# Patient Record
Sex: MALE | Race: BLACK OR AFRICAN AMERICAN | NOT HISPANIC OR LATINO | Employment: OTHER | URBAN - METROPOLITAN AREA
[De-identification: names, ages, dates, MRNs, and addresses within clinical notes are randomized per-mention and may not be internally consistent; named-entity substitution may affect disease eponyms.]

---

## 2023-03-07 DIAGNOSIS — I10 HYPERTENSION, UNSPECIFIED TYPE: Primary | ICD-10-CM

## 2023-03-07 RX ORDER — LISINOPRIL 40 MG/1
1 TABLET ORAL DAILY
COMMUNITY
Start: 2022-07-19 | End: 2023-03-07 | Stop reason: SDUPTHER

## 2023-03-08 ENCOUNTER — TELEPHONE (OUTPATIENT)
Dept: PRIMARY CARE | Facility: CLINIC | Age: 63
End: 2023-03-08
Payer: COMMERCIAL

## 2023-03-08 DIAGNOSIS — I10 HYPERTENSION, UNSPECIFIED TYPE: ICD-10-CM

## 2023-03-08 RX ORDER — LISINOPRIL 40 MG/1
40 TABLET ORAL DAILY
Qty: 90 TABLET | Refills: 3 | Status: SHIPPED | OUTPATIENT
Start: 2023-03-08 | End: 2024-02-22 | Stop reason: SDUPTHER

## 2023-03-08 RX ORDER — LISINOPRIL 40 MG/1
40 TABLET ORAL DAILY
Qty: 90 TABLET | Refills: 1 | Status: SHIPPED | OUTPATIENT
Start: 2023-03-08 | End: 2023-03-08 | Stop reason: SDUPTHER

## 2023-04-11 ENCOUNTER — HOSPITAL ENCOUNTER (OUTPATIENT)
Dept: DATA CONVERSION | Facility: HOSPITAL | Age: 63
End: 2023-04-11
Attending: RADIOLOGY
Payer: COMMERCIAL

## 2023-04-11 DIAGNOSIS — N13.30 UNSPECIFIED HYDRONEPHROSIS: ICD-10-CM

## 2023-04-11 LAB
ERYTHROCYTE DISTRIBUTION WIDTH (RATIO) BY AUTOMATED COUNT: 13.5 % (ref 11.5–14.5)
ERYTHROCYTE MEAN CORPUSCULAR HEMOGLOBIN CONCENTRATION (G/DL) BY AUTOMATED: 34.1 G/DL (ref 32–36)
ERYTHROCYTE MEAN CORPUSCULAR VOLUME (FL) BY AUTOMATED COUNT: 90 FL (ref 80–100)
ERYTHROCYTES (10*6/UL) IN BLOOD BY AUTOMATED COUNT: 5.1 X10E12/L (ref 4.5–5.9)
HEMATOCRIT (%) IN BLOOD BY AUTOMATED COUNT: 46 % (ref 41–52)
HEMOGLOBIN (G/DL) IN BLOOD: 15.7 G/DL (ref 13.5–17.5)
LEUKOCYTES (10*3/UL) IN BLOOD BY AUTOMATED COUNT: 4.4 X10E9/L (ref 4.4–11.3)
PLATELETS (10*3/UL) IN BLOOD AUTOMATED COUNT: 213 X10E9/L (ref 150–450)

## 2023-04-14 ENCOUNTER — OFFICE VISIT (OUTPATIENT)
Dept: PRIMARY CARE | Facility: CLINIC | Age: 63
End: 2023-04-14
Payer: COMMERCIAL

## 2023-04-14 VITALS
SYSTOLIC BLOOD PRESSURE: 119 MMHG | DIASTOLIC BLOOD PRESSURE: 74 MMHG | WEIGHT: 197 LBS | HEART RATE: 54 BPM | RESPIRATION RATE: 16 BRPM | HEIGHT: 67 IN | OXYGEN SATURATION: 95 % | BODY MASS INDEX: 30.92 KG/M2

## 2023-04-14 DIAGNOSIS — Z00.00 HEALTH CARE MAINTENANCE: ICD-10-CM

## 2023-04-14 DIAGNOSIS — N40.1 BENIGN PROSTATIC HYPERPLASIA WITH LOWER URINARY TRACT SYMPTOMS, SYMPTOM DETAILS UNSPECIFIED: ICD-10-CM

## 2023-04-14 DIAGNOSIS — Q62.11 HYDRONEPHROSIS WITH URETEROPELVIC JUNCTION (UPJ) OBSTRUCTION: ICD-10-CM

## 2023-04-14 DIAGNOSIS — I10 HYPERTENSION, UNSPECIFIED TYPE: Primary | ICD-10-CM

## 2023-04-14 DIAGNOSIS — M25.512 CHRONIC LEFT SHOULDER PAIN: ICD-10-CM

## 2023-04-14 DIAGNOSIS — Z12.11 COLON CANCER SCREENING: ICD-10-CM

## 2023-04-14 DIAGNOSIS — E55.9 VITAMIN D DEFICIENCY: ICD-10-CM

## 2023-04-14 DIAGNOSIS — G89.29 CHRONIC LEFT SHOULDER PAIN: ICD-10-CM

## 2023-04-14 PROBLEM — N13.5 URETEROPELVIC JUNCTION (UPJ) OBSTRUCTION, LEFT: Status: ACTIVE | Noted: 2023-04-14

## 2023-04-14 PROBLEM — N13.8 BENIGN PROSTATIC HYPERPLASIA WITH URINARY OBSTRUCTION AND OTHER LOWER URINARY TRACT SYMPTOMS: Status: ACTIVE | Noted: 2023-04-14

## 2023-04-14 PROBLEM — N13.30 HYDRONEPHROSIS, LEFT: Status: ACTIVE | Noted: 2023-04-14

## 2023-04-14 PROBLEM — E53.8 VITAMIN B12 DEFICIENCY: Status: ACTIVE | Noted: 2023-04-14

## 2023-04-14 PROBLEM — R35.1 NOCTURIA: Status: ACTIVE | Noted: 2023-04-14

## 2023-04-14 PROBLEM — R00.2 PALPITATIONS: Status: ACTIVE | Noted: 2023-04-14

## 2023-04-14 PROBLEM — E53.8 VITAMIN B12 DEFICIENCY: Status: RESOLVED | Noted: 2023-04-14 | Resolved: 2023-04-14

## 2023-04-14 PROCEDURE — 3078F DIAST BP <80 MM HG: CPT | Performed by: INTERNAL MEDICINE

## 2023-04-14 PROCEDURE — 3074F SYST BP LT 130 MM HG: CPT | Performed by: INTERNAL MEDICINE

## 2023-04-14 PROCEDURE — 99204 OFFICE O/P NEW MOD 45 MIN: CPT | Performed by: INTERNAL MEDICINE

## 2023-04-14 PROCEDURE — 1036F TOBACCO NON-USER: CPT | Performed by: INTERNAL MEDICINE

## 2023-04-14 RX ORDER — AMLODIPINE BESYLATE 10 MG/1
10 TABLET ORAL DAILY
COMMUNITY
End: 2023-08-01 | Stop reason: SDUPTHER

## 2023-04-14 RX ORDER — OXYBUTYNIN CHLORIDE 5 MG/1
TABLET ORAL
COMMUNITY
Start: 2023-04-13 | End: 2024-04-04 | Stop reason: WASHOUT

## 2023-04-14 RX ORDER — TRAMADOL HYDROCHLORIDE 50 MG/1
TABLET ORAL
COMMUNITY
Start: 2022-09-22 | End: 2024-04-04 | Stop reason: WASHOUT

## 2023-04-14 RX ORDER — METOPROLOL TARTRATE 50 MG/1
1 TABLET ORAL EVERY 12 HOURS
COMMUNITY
Start: 2022-07-19 | End: 2024-04-04 | Stop reason: SDUPTHER

## 2023-04-14 RX ORDER — CEFDINIR 300 MG/1
CAPSULE ORAL
COMMUNITY
Start: 2023-04-13 | End: 2024-04-04 | Stop reason: WASHOUT

## 2023-04-14 RX ORDER — TAMSULOSIN HYDROCHLORIDE 0.4 MG/1
CAPSULE ORAL
COMMUNITY
Start: 2023-04-13 | End: 2024-04-04 | Stop reason: WASHOUT

## 2023-04-14 SDOH — ECONOMIC STABILITY: FOOD INSECURITY: WITHIN THE PAST 12 MONTHS, THE FOOD YOU BOUGHT JUST DIDN'T LAST AND YOU DIDN'T HAVE MONEY TO GET MORE.: NEVER TRUE

## 2023-04-14 SDOH — ECONOMIC STABILITY: FOOD INSECURITY: WITHIN THE PAST 12 MONTHS, YOU WORRIED THAT YOUR FOOD WOULD RUN OUT BEFORE YOU GOT MONEY TO BUY MORE.: NEVER TRUE

## 2023-04-14 ASSESSMENT — PATIENT HEALTH QUESTIONNAIRE - PHQ9
2. FEELING DOWN, DEPRESSED OR HOPELESS: NOT AT ALL
1. LITTLE INTEREST OR PLEASURE IN DOING THINGS: NOT AT ALL
SUM OF ALL RESPONSES TO PHQ9 QUESTIONS 1 & 2: 0

## 2023-04-14 ASSESSMENT — LIFESTYLE VARIABLES
SKIP TO QUESTIONS 9-10: 1
HOW OFTEN DO YOU HAVE SIX OR MORE DRINKS ON ONE OCCASION: NEVER
HOW OFTEN DO YOU HAVE A DRINK CONTAINING ALCOHOL: NEVER
AUDIT-C TOTAL SCORE: 0
HOW MANY STANDARD DRINKS CONTAINING ALCOHOL DO YOU HAVE ON A TYPICAL DAY: PATIENT DOES NOT DRINK

## 2023-04-14 NOTE — ASSESSMENT & PLAN NOTE
Patient is to follow up with Dr Preston, he was just discharged from the hospital, [facility], yesterday. He is to follow up with Dr Preston for removal of stent, recheck renal function

## 2023-04-14 NOTE — ASSESSMENT & PLAN NOTE
This is an ongoing issue, it is a chronic problem, will refer to orthopedics/ shoulder for an assessment of the shoulder issue

## 2023-04-14 NOTE — PROGRESS NOTES
Chief Complaint/HPI:    Initial visit with me, patient previously saw Yeison Mckenzie NP last year for one visit.    Patient was recently hospitalized with UPJ obstruction of the left kidney and hydronephrosis. Patient was placed on tamsulosin and oxybutynin. He is to follow up with urology for re assessment. Patient has had stent placement x 2 in the past per hospital record, patient had drainage tube placed in the back. This was placed to drain he hydronephrosis, he has seen Dr Preston for treatment. Patient still has pain in the left back at site of drainage tube placement.  He had additional stent placed recently. Patient sees Dr Preston for follow up on 4/25/2023, he apparently is supposed to have stents removed at that time    HTN: patient takes amlodipine, lisinopril, metoprolol ordered , BP is well controlled    Vitamin d deficiency: this is noted, he takes no meds for this    Left shoulder pain: patient struck shoulder on a TV when he was young, patient moves a lot at work, he has ongoing pain of the left shoulder      ROS otherwise negative aside from what was mentioned above in HPI.      Patient Active Problem List   Diagnosis    Hypertension    Hydronephrosis with ureteropelvic junction (UPJ) obstruction    Benign prostatic hyperplasia with lower urinary tract symptoms    Hydronephrosis, left    Nocturia    Palpitations    Ureteropelvic junction (UPJ) obstruction, left    Vitamin D deficiency    HTN (hypertension), benign    Chronic left shoulder pain         Past Medical History:   Diagnosis Date    Benign prostatic hyperplasia with urinary obstruction and other lower urinary tract symptoms 04/14/2023    HTN (hypertension), benign 04/14/2023    Hydronephrosis with ureteropelvic junction (UPJ) obstruction 04/14/2023    Hydronephrosis, left 04/14/2023    Hypertension 04/14/2023    Nocturia 04/14/2023    Palpitations 04/14/2023    Personal history of other diseases of the circulatory system     History of  "hypertension    Ureteropelvic junction (UPJ) obstruction, left 04/14/2023    Vitamin B12 deficiency 04/14/2023    Vitamin D deficiency 04/14/2023     No past surgical history on file.  Social History     Social History Narrative    Not on file         ALLERGIES  Patient has no known allergies.      MEDICATIONS  Current Outpatient Medications on File Prior to Visit   Medication Sig Dispense Refill    amLODIPine (Norvasc) 10 mg tablet Take 1 tablet (10 mg) by mouth once daily.      cefdinir (Omnicef) 300 mg capsule       lisinopril 40 mg tablet Take 1 tablet (40 mg) by mouth once daily. 90 tablet 3    metoprolol tartrate (Lopressor) 50 mg tablet Take 1 tablet (50 mg) by mouth in the morning and 1 tablet (50 mg) in the evening.      oxybutynin (Ditropan) 5 mg tablet       tamsulosin (Flomax) 0.4 mg 24 hr capsule       traMADol (Ultram) 50 mg tablet TAKE 1 TABLET BY MOUTH EVEYR 6 HOURS AS NEEDED FOR PAIN       No current facility-administered medications on file prior to visit.         PHYSICAL EXAM  /74   Pulse 54   Resp 16   Ht 1.713 m (5' 7.44\")   Wt 89.4 kg (197 lb)   SpO2 95%   BMI 30.45 kg/m²   Body mass index is 30.45 kg/m².  Gen: Alert, NAD  HEENT:  PERRLA, EOMI, conjunctiva and sclera normal in appearance  Respiratory:  Lungs CTAB  Cardiovascular:  Heart RRR. No M/R/G  Abdomen: a bandage is present on the left flank region, slight tenderness to palpation in the left flamk  Neuro:  Gross motor and sensory intact  Skin:  No suspicious lesions present on exposed surfaces  MSK: tenderness of the left shoulder with external rotation, no crepitus or significant limitation of ROM now     ASSESSMENT/PLAN  Problem List Items Addressed This Visit          Circulatory    Hypertension - Primary    Current Assessment & Plan     BP is controlled today, no med changes. Check lipids also            Genitourinary    Benign prostatic hyperplasia with lower urinary tract symptoms       Musculoskeletal    Chronic left " shoulder pain    Current Assessment & Plan     This is an ongoing issue, it is a chronic problem, will refer to orthopedics/ shoulder for an assessment of the shoulder issue            Endocrine/Metabolic    Vitamin D deficiency    Current Assessment & Plan     Check 25OH vitamin d level, he takes no meds now            Other    Hydronephrosis with ureteropelvic junction (UPJ) obstruction    Current Assessment & Plan     Patient is to follow up with Dr Preston, he was just discharged from the hospital, [facility], yesterday. He is to follow up with Dr Preston for removal of stent, recheck renal function          Follow up after testing, follow up with Dr Preston  Colonoscopy screening ordered, hepatitis c screening order    Bashir Huston MD

## 2023-05-09 ENCOUNTER — HOSPITAL ENCOUNTER (OUTPATIENT)
Dept: DATA CONVERSION | Facility: HOSPITAL | Age: 63
End: 2023-05-09
Attending: INTERNAL MEDICINE | Admitting: INTERNAL MEDICINE
Payer: COMMERCIAL

## 2023-05-09 DIAGNOSIS — N32.89 OTHER SPECIFIED DISORDERS OF BLADDER: ICD-10-CM

## 2023-05-09 DIAGNOSIS — E66.9 OBESITY, UNSPECIFIED: ICD-10-CM

## 2023-05-09 DIAGNOSIS — D12.4 BENIGN NEOPLASM OF DESCENDING COLON: ICD-10-CM

## 2023-05-09 DIAGNOSIS — K64.8 OTHER HEMORRHOIDS: ICD-10-CM

## 2023-05-09 DIAGNOSIS — Z12.11 ENCOUNTER FOR SCREENING FOR MALIGNANT NEOPLASM OF COLON: ICD-10-CM

## 2023-05-09 DIAGNOSIS — Z79.82 LONG TERM (CURRENT) USE OF ASPIRIN: ICD-10-CM

## 2023-05-09 DIAGNOSIS — I10 ESSENTIAL (PRIMARY) HYPERTENSION: ICD-10-CM

## 2023-05-09 DIAGNOSIS — N13.30 UNSPECIFIED HYDRONEPHROSIS: ICD-10-CM

## 2023-05-09 DIAGNOSIS — N40.0 BENIGN PROSTATIC HYPERPLASIA WITHOUT LOWER URINARY TRACT SYMPTOMS: ICD-10-CM

## 2023-05-16 LAB
COMPLETE PATHOLOGY REPORT: NORMAL
CONVERTED CLINICAL DIAGNOSIS-HISTORY: NORMAL
CONVERTED FINAL DIAGNOSIS: NORMAL
CONVERTED FINAL REPORT PDF LINK TO COPY AND PASTE: NORMAL
CONVERTED GROSS DESCRIPTION: NORMAL

## 2023-05-22 ENCOUNTER — HOSPITAL ENCOUNTER (OUTPATIENT)
Dept: DATA CONVERSION | Facility: HOSPITAL | Age: 63
End: 2023-05-22
Attending: STUDENT IN AN ORGANIZED HEALTH CARE EDUCATION/TRAINING PROGRAM | Admitting: STUDENT IN AN ORGANIZED HEALTH CARE EDUCATION/TRAINING PROGRAM

## 2023-06-12 ENCOUNTER — HOSPITAL ENCOUNTER (OUTPATIENT)
Dept: DATA CONVERSION | Facility: HOSPITAL | Age: 63
End: 2023-06-13
Attending: STUDENT IN AN ORGANIZED HEALTH CARE EDUCATION/TRAINING PROGRAM | Admitting: STUDENT IN AN ORGANIZED HEALTH CARE EDUCATION/TRAINING PROGRAM
Payer: COMMERCIAL

## 2023-06-12 DIAGNOSIS — N40.0 BENIGN PROSTATIC HYPERPLASIA WITHOUT LOWER URINARY TRACT SYMPTOMS: ICD-10-CM

## 2023-06-12 DIAGNOSIS — N13.5 CROSSING VESSEL AND STRICTURE OF URETER WITHOUT HYDRONEPHROSIS: ICD-10-CM

## 2023-06-12 DIAGNOSIS — K21.9 GASTRO-ESOPHAGEAL REFLUX DISEASE WITHOUT ESOPHAGITIS: ICD-10-CM

## 2023-06-12 DIAGNOSIS — I10 ESSENTIAL (PRIMARY) HYPERTENSION: ICD-10-CM

## 2023-06-12 DIAGNOSIS — Z79.1 LONG TERM (CURRENT) USE OF NON-STEROIDAL ANTI-INFLAMMATORIES (NSAID): ICD-10-CM

## 2023-06-12 DIAGNOSIS — Z79.82 LONG TERM (CURRENT) USE OF ASPIRIN: ICD-10-CM

## 2023-06-12 DIAGNOSIS — Z87.891 PERSONAL HISTORY OF NICOTINE DEPENDENCE: ICD-10-CM

## 2023-06-12 DIAGNOSIS — N20.1 CALCULUS OF URETER: ICD-10-CM

## 2023-06-12 LAB
ABO GROUP (TYPE) IN BLOOD: NORMAL
ANION GAP IN SER/PLAS: 15 MMOL/L (ref 10–20)
ANTIBODY SCREEN: NORMAL
CALCIUM (MG/DL) IN SER/PLAS: 8.5 MG/DL (ref 8.6–10.3)
CARBON DIOXIDE, TOTAL (MMOL/L) IN SER/PLAS: 20 MMOL/L (ref 21–32)
CHLORIDE (MMOL/L) IN SER/PLAS: 105 MMOL/L (ref 98–107)
CREATININE (MG/DL) IN SER/PLAS: 0.93 MG/DL (ref 0.5–1.3)
ERYTHROCYTE DISTRIBUTION WIDTH (RATIO) BY AUTOMATED COUNT: 13.2 % (ref 11.5–14.5)
ERYTHROCYTE MEAN CORPUSCULAR HEMOGLOBIN CONCENTRATION (G/DL) BY AUTOMATED: 32.7 G/DL (ref 32–36)
ERYTHROCYTE MEAN CORPUSCULAR VOLUME (FL) BY AUTOMATED COUNT: 92 FL (ref 80–100)
ERYTHROCYTES (10*6/UL) IN BLOOD BY AUTOMATED COUNT: 4.84 X10E12/L (ref 4.5–5.9)
GFR MALE: >90 ML/MIN/1.73M2
GLUCOSE (MG/DL) IN SER/PLAS: 170 MG/DL (ref 74–99)
HEMATOCRIT (%) IN BLOOD BY AUTOMATED COUNT: 44.3 % (ref 41–52)
HEMOGLOBIN (G/DL) IN BLOOD: 14.5 G/DL (ref 13.5–17.5)
LEUKOCYTES (10*3/UL) IN BLOOD BY AUTOMATED COUNT: 9.8 X10E9/L (ref 4.4–11.3)
PLATELETS (10*3/UL) IN BLOOD AUTOMATED COUNT: 227 X10E9/L (ref 150–450)
POTASSIUM (MMOL/L) IN SER/PLAS: 3.9 MMOL/L (ref 3.5–5.3)
RH FACTOR: NORMAL
SODIUM (MMOL/L) IN SER/PLAS: 136 MMOL/L (ref 136–145)
UREA NITROGEN (MG/DL) IN SER/PLAS: 12 MG/DL (ref 6–23)

## 2023-06-13 LAB
ANION GAP IN SER/PLAS: 15 MMOL/L (ref 10–20)
CALCIUM (MG/DL) IN SER/PLAS: 9 MG/DL (ref 8.6–10.3)
CARBON DIOXIDE, TOTAL (MMOL/L) IN SER/PLAS: 22 MMOL/L (ref 21–32)
CHLORIDE (MMOL/L) IN SER/PLAS: 102 MMOL/L (ref 98–107)
CREATININE (MG/DL) IN SER/PLAS: 1.02 MG/DL (ref 0.5–1.3)
ERYTHROCYTE DISTRIBUTION WIDTH (RATIO) BY AUTOMATED COUNT: 13.4 % (ref 11.5–14.5)
ERYTHROCYTE MEAN CORPUSCULAR HEMOGLOBIN CONCENTRATION (G/DL) BY AUTOMATED: 31.7 G/DL (ref 32–36)
ERYTHROCYTE MEAN CORPUSCULAR VOLUME (FL) BY AUTOMATED COUNT: 94 FL (ref 80–100)
ERYTHROCYTES (10*6/UL) IN BLOOD BY AUTOMATED COUNT: 4.81 X10E12/L (ref 4.5–5.9)
GFR MALE: 82 ML/MIN/1.73M2
GLUCOSE (MG/DL) IN SER/PLAS: 118 MG/DL (ref 74–99)
HEMATOCRIT (%) IN BLOOD BY AUTOMATED COUNT: 45.4 % (ref 41–52)
HEMOGLOBIN (G/DL) IN BLOOD: 14.4 G/DL (ref 13.5–17.5)
LEUKOCYTES (10*3/UL) IN BLOOD BY AUTOMATED COUNT: 10 X10E9/L (ref 4.4–11.3)
PLATELETS (10*3/UL) IN BLOOD AUTOMATED COUNT: 206 X10E9/L (ref 150–450)
POTASSIUM (MMOL/L) IN SER/PLAS: 4.1 MMOL/L (ref 3.5–5.3)
SODIUM (MMOL/L) IN SER/PLAS: 135 MMOL/L (ref 136–145)
UREA NITROGEN (MG/DL) IN SER/PLAS: 14 MG/DL (ref 6–23)

## 2023-08-01 ENCOUNTER — TELEPHONE (OUTPATIENT)
Dept: PRIMARY CARE | Facility: CLINIC | Age: 63
End: 2023-08-01
Payer: COMMERCIAL

## 2023-08-01 DIAGNOSIS — I10 PRIMARY HYPERTENSION: Primary | ICD-10-CM

## 2023-08-01 RX ORDER — AMLODIPINE BESYLATE 10 MG/1
10 TABLET ORAL DAILY
Qty: 90 TABLET | Refills: 1 | Status: SHIPPED | OUTPATIENT
Start: 2023-08-01 | End: 2024-01-25

## 2023-08-01 NOTE — TELEPHONE ENCOUNTER
Pt is needing Amlodipine 10mg. This was last rx'd by a doctor in the ER, Dr. ANDRE has not prescribed this. [facility] in [place].

## 2023-09-07 VITALS
SYSTOLIC BLOOD PRESSURE: 188 MMHG | WEIGHT: 195.33 LBS | DIASTOLIC BLOOD PRESSURE: 107 MMHG | HEIGHT: 69 IN | TEMPERATURE: 97.9 F | RESPIRATION RATE: 18 BRPM | BODY MASS INDEX: 28.93 KG/M2 | HEART RATE: 55 BPM

## 2023-09-07 VITALS — HEIGHT: 69 IN | BODY MASS INDEX: 27.43 KG/M2 | WEIGHT: 185.19 LBS

## 2023-09-30 NOTE — DISCHARGE SUMMARY
Send Summary:   Discharge Summary Providers:  Provider Role Provider Name   · Attending Mynor Preston   · Referring Mynor Preston   · Primary Bashir Huston       Note Recipients: Mynor Preston MD Jastrzemski, Edward, MD       Discharge:    Summary:   Admission Date: .12-Jun-2023 05:42:00   Discharge Date: 13-Jun-2023   Attending Physician at Discharge: Mynor Preston   Admission Reason: Left UPJ obstruction   Final Discharge Diagnoses: Robotic-Assisted Left  Dismembered Pyeloplasty  Left Ureteral Stent Exchange  Left Retrograde Pyelogram, Left Ureteroscopy   Procedures: Date: 12-Jun-2023 18:44:00  Procedure Name: 1. ROBOT-ASSISTED LEFT DISMEMBERED PYELOPLASTY  2. LEFT URETERAL STENT EXCHANGE  3. LEFT RETROGRADE PYELOGRAM, LEFT URETEROSCOPY   Condition at Discharge: Satisfactory   Disposition at Discharge: .Home   Vital Signs:        T   P  R  BP   MAP  SpO2   Value  36.1  57  18  159/73      96%  Date/Time 6/13 9:57 6/13 9:57 6/13 9:57 6/13 9:57    6/13 9:57  Range  (36C - 36.4C )  (46 - 99 )  (16 - 19 )  (126 - 159 )/ (68 - 85 )    (93% - 96% )    Date:            Weight/Scale Type:  Height:   12-Jun-2023 15:03  88.6  kg         175.2  cm  Physical Exam:    PHYSICAL EXAM:  General:  NAD, well-nourished, well-developed  HEENT:  NC/AT, MMM, PERRLA, EOMI  Pulmonary: CTA bilaterally, neck supple, no LAD  Cardiovascular:  RRR, no M/R/G  Abdomen: soft, NT/ND, +BSx4  Extremities: no C/C/E, PPPx4, HUNTER x4  Neurological: CN II-XII intact, gait WNL, no focal abnormalities  Skin: dry and warm, no rashes    Hospital Course:    Mr. Godoy is a very pleasant 62 year old  male with a history of having left UPJ obstruction.  The patient underwent Robotic-Assisted Left Dismembered  Pyeloplasty, Left Ureteral Stent Exchange, Left Retrograde Pyelogram and Left Ureteroscopy on 6-12-23 with Dr. Preston.  The patient tolerated the procedure well and there were no complications.  A maldonado catheter and JOSTIN drain  were left in place overnight;  drain output was minimal at 15 ml. therefore it was removed without difficulty.  UOP 2.3L, the maldonado catheter was removed and the patient was able to void.  Labs were unremarkable and within normal limits.  We managed the patient's surgical pain with  acetaminophen and oxycodone as needed and encouraged him to ambulate frequently.  The patient was tolerating a regular diet and ambulating without difficulty therefore he was discharged home with instructions to follow-up with Dr. Preston in two weeks.   Prescriptions for oxycodone and miralax were provided at discharge.            Discharge Information:    and Continuing Care:   Lab Results - Pending:    Surgical Pathology Drawn at 12-Jun-2023 11:24:00  Radiology Results - Pending: None   Discharge Instructions:    Activity:           activity as tolerated.          May shower..  When no drainage from drain site          May not return to school/work.            May drive..  Beginning in 5-7 days          No pushing, pulling, or lifting objects greater than 10 pounds for 4 week(s).            Up and down the stairs is fine.  Walk around intermittently throughout the day otherwise rest and recover from your surgery.    Nutrition/Diet:           regular          Encourage Fluids:   Drink plenty of water daily, stay hydrated.    Wound Care:           Wound Site:   Abdomen          Wound Type:   surgical incision          Instructions:   no lotions, creams, or tub soaks          Other Instructions:   Leave your incisions open to air.    Maintain a clean, dry gauze over the drain site for 24-48 hours until there is no drainage.  Then leave open to air.    Follow Up Appointments:    Follow-Up Appointment 01:           Physician/Dept/Service:   Dr. Preston          Reason for Referral:   Urology          Call to Schedule in:   2 weeks          Phone Number:   [phone]    Discharge Medications: Home Medication   amLODIPine 10 mg oral tablet -  1 tab(s) oral once a day at bedtime  lisinopril 40 mg oral tablet - 1 tab(s) oral once a day at bedtime  Aspir 81 81 mg oral delayed release tablet - 1 cap(s) oral once a month  metoprolol 50 mg oral tablet - 1 tab(s) orally 2 times a day  meloxicam 15 mg oral tablet - 1 tab(s) orally once a day     PRN Medication   hyoscyamine 0.125 mg sublingual tablet - 1 tab(s) sublingual every 4 hours, As Needed  oxyCODONE 5 mg oral tablet - 1 tab(s) orally every 6 hours, As Needed   MiraLax oral powder for reconstitution - 17 gram(s) orally once a day, As Needed   acetaminophen 500 mg oral tablet - 2 tab(s) orally every 6 hours, As Needed   Issues to Discuss at Follow-up / Goals for Continuing Care:    pathology  pain  activity      DNR Status:   ·  Code Status Code Status order at time of discharge: Full Code       Electronic Signatures:  Paola Arshad (PA (PHYSICIAN))  (Signed 13-Jun-2023 11:52)   Authored: Send Summary, Summary Content, Ongoing Care,  DNR Status, Note Completion      Last Updated: 13-Jun-2023 11:52 by Paola Arshad (PA (PHYSICIAN))

## 2023-09-30 NOTE — H&P
History of Present Illness:   History Present Illness:  Reason for surgery: left UPJ obstruction   HPI:    Patient presents with above. Has indwelling left ureteral stent - unable to remove due to distal J-hooking and inability to cannulate ureter from below.  Presents today for cystoscopy, L pyelogram and stent exchange, left robotic pyeloplasty, any other indicated procedures.    Allergies:        Allergies:  ·  No Known Allergies :     Home Medication Review:   Home Medications Reviewed: no     Impression/Procedure:   ·  Impression and Planned Procedure: L UPJ obstruction  Plan: cystoscopy, L retrograde pyelogram and stent exchange, left robotic pyeloplasty       ERAS (Enhanced Recovery After Surgery):  ·  ERAS Patient: no       Vital Signs:  Temperature C: 36.6 degrees C   Temperature F: 97.8 degrees F   Heart Rate: 55 beats per minute   Respiratory Rate: 18 breath per minute   Blood Pressure Systolic: 188 mm/Hg   Blood Pressure Diastolic: 107 mm/Hg     Physical Exam by System:    Respiratory/Thorax: unlabored   Cardiovascular: bradycardia     Consent:   COVID-19 Consent:  ·  COVID-19 Risk Consent Surgeon has reviewed key risks related to the risk of agnes COVID-19 and if they contract COVID-19 what the risks are.       Electronic Signatures:  Mynor Preston)  (Signed 12-Jun-2023 07:32)   Authored: History of Present Illness, Allergies, Home  Medication Review, Impression/Procedure, ERAS, Physical Exam, Consent, Note Completion      Last Updated: 12-Jun-2023 07:32 by Mynor Preston)

## 2023-10-02 NOTE — OP NOTE
PROCEDURE DETAILS    Preoperative Diagnosis:  Left UPJ obstruction   Postoperative Diagnosis:  Left UPJ obstruction   Surgeon: Mynor Preston  Resident/Fellow/Other Assistant: Nessa Bonilla CNP    Procedure:  1. ROBOT-ASSISTED LEFT DISMEMBERED PYELOPLASTY  2. LEFT URETERAL STENT EXCHANGE  3. LEFT RETROGRADE PYELOGRAM, LEFT URETEROSCOPY     Anesthesia: Jason Rivera  Estimated Blood Loss: 5 mL   Findings: Left UPJ obstruction   Specimens(s) Collected: yes,  Left UPJ   Drains and/or Catheters: 15 F Shahram drain left lower quadrant  20 F 3-way Austin catheter   Implants: 6x28 left ureteral stent         Operative Report:   Patient was met in the preoperative holding area where informed consent was obtained.  Brought to the operative suite where general anesthesia  was induced in supine position.  EPC cuffs on and working.  Received 2 g Ancef IV piggyback for antibiotic prophylaxis.  Transferred to dorsolithotomy position and prepped and draped in usual fashion.  Timeout performed and all parties in agreement.    I began by inserting a 22 Egyptian rigid cystoscope with 30 degree lens into the urethra and bladder.  Anterior urethra normal.  Prostatic urethra with wide caliber bladder neck contracture.  I cannulated a straight Glidewire alongside the indwelling left  ureteral stent and remove the stent intact.  I used a 10 Egyptian dual-lumen catheter to place a second wire and perform retrograde pyelography which demonstrated hydronephrosis with blunting of the calyces to the level of the UPJ.  Ureter otherwise nondilated.   No overt filling defects.  Given his smoking history and history of blood products in the renal pelvis after his stent placement, I elected to perform diagnostic ureteroscopy and advanced a flexible ureteroscope over the working wire into the renal pelvis.   It was difficult to navigate the scope past the narrow UPJ.  Tom pyeloscopy was unremarkable with no evidence of stone fragment, small stone had  been noted on prior CT in April.  No papillary tumors.  Withdrew the ureteroscope.  Over the remaining safety  wire I placed a 6 New Zealander by 28 cm stent in the usual fashion with the position confirmed fluoroscopically.  I placed a 20 New Zealander three-way Austin catheter in the bladder with 10 mL sterile water in the balloon.  Patient was returned to supine position  and then adjusted into a right lateral decubitus position left side up.  Veress needle access obtained with a low opening pressure and abdomen was insufflated to 15 mmHg.  Drop test was negative and there is no evidence of Veress needle injury on inspection  of the abdomen.  I placed 8 mm robotic port under direct visualization and placed 2 additional robotic ports and an 8 mm air seal assistant port.  I then proceeded to reflect the left colon medially using monopolar scissor in my right arm and bipolar  my left arm.  I dissected until I encountered the gonadal vein and artery which were medialized and then I encountered the ureter which was traced up to the renal pelvis.  The insertion of the renal pelvis was identified and circumferentially dissected  taking care not to devascularize the ureter.  UPJ was sharply incised with scissors and a segment was sent for pathology.  Lateral ureter and medial renal pelvis were spatulated and then a running anastomosis of 4-0 Vicryl was completed.  Catheter was  clamped and bladder was backfilled to distend the renal pelvis and there is no evidence of leak.  Anastomosis appeared dependent, tension-free, and watertight.  I did reretroperitonealized the ureter with 3 OV lock.  At this point hemostasis was persistent.   I did leave a 15 New Zealander round drain in the lowermost trocar site which was secured with a 3-0 nylon suture.  All ports were removed under visualization and skin incisions were closed with 4 Monocryl and skin glue.  Patient was extubated and taken recovery  in stable condition.    Plan:  The patient will  have catheter removed tomorrow morning for void trial.  As long as his drain output does not increase significantly his drain will be removed tomorrow afternoon in anticipation of discharge.  He will follow-up for stent removal in about  4 weeks.        I certify that the services  provided by the [facility] including prepping the patient, docking the robot, exchanging robotic arms, providing retraction, and closure of the operative site for which payment is claimed were medically necessary. No qualified resident assist was available  as listed residents have not taken part in a certified robotic assistant training program and are precluded from furnishing above services due to involvement in educational activities during the case.                            Attestation:   Note Completion:  Attending Attestation I performed the procedure without a resident         Electronic Signatures:  Mynor Preston)  (Signed 12-Jun-2023 18:57)   Authored: Post-Operative Note, Chart Review, Note Completion      Last Updated: 12-Jun-2023 18:57 by Mynor Preston)

## 2023-10-12 ENCOUNTER — TELEPHONE (OUTPATIENT)
Dept: PRIMARY CARE | Facility: CLINIC | Age: 63
End: 2023-10-12
Payer: COMMERCIAL

## 2023-10-12 NOTE — TELEPHONE ENCOUNTER
Patient called in wanting to know which vaccination Dr. ANDRE gave him during his last visit. Please advise.

## 2023-10-27 NOTE — TELEPHONE ENCOUNTER
I do not see any vaccinations administered in [employer], [employer], or in the [place] registry. Also, they are not mentioned at all in any office notes over the last few years.

## 2024-01-25 DIAGNOSIS — I10 PRIMARY HYPERTENSION: ICD-10-CM

## 2024-01-25 RX ORDER — AMLODIPINE BESYLATE 10 MG/1
10 TABLET ORAL DAILY
Qty: 90 TABLET | Refills: 1 | Status: SHIPPED | OUTPATIENT
Start: 2024-01-25 | End: 2024-04-04 | Stop reason: SDUPTHER

## 2024-01-31 ENCOUNTER — TELEPHONE (OUTPATIENT)
Dept: PRIMARY CARE | Facility: CLINIC | Age: 64
End: 2024-01-31
Payer: COMMERCIAL

## 2024-01-31 NOTE — TELEPHONE ENCOUNTER
Pt brought in form from [facility] plasma and requested PCP fill it out to be able to donate plasma. Form placed in [facility] mailbox.

## 2024-02-22 ENCOUNTER — TELEPHONE (OUTPATIENT)
Dept: PRIMARY CARE | Facility: CLINIC | Age: 64
End: 2024-02-22
Payer: COMMERCIAL

## 2024-02-22 DIAGNOSIS — I10 HYPERTENSION, UNSPECIFIED TYPE: ICD-10-CM

## 2024-02-22 RX ORDER — LISINOPRIL 40 MG/1
40 TABLET ORAL DAILY
Qty: 90 TABLET | Refills: 1 | Status: SHIPPED | OUTPATIENT
Start: 2024-02-22 | End: 2024-04-04 | Stop reason: SDUPTHER

## 2024-02-22 NOTE — TELEPHONE ENCOUNTER
Pt called in and stated that he just got his insurance back and he scheduled with you on 4/4. He needs a new script sent for lisinopril 40 mg tablet sent to [facility]. He has been out for 2 days. Please advise.

## 2024-02-28 PROBLEM — M25.512 LEFT SHOULDER PAIN: Status: ACTIVE | Noted: 2024-02-28

## 2024-02-28 PROBLEM — N13.8 BENIGN PROSTATIC HYPERPLASIA WITH URINARY OBSTRUCTION AND OTHER LOWER URINARY TRACT SYMPTOMS: Status: ACTIVE | Noted: 2024-02-28

## 2024-02-28 PROBLEM — N40.1 BENIGN PROSTATIC HYPERPLASIA WITH URINARY OBSTRUCTION AND OTHER LOWER URINARY TRACT SYMPTOMS: Status: ACTIVE | Noted: 2024-02-28

## 2024-02-28 PROBLEM — E53.8 VITAMIN B12 DEFICIENCY: Status: ACTIVE | Noted: 2024-02-28

## 2024-02-29 ENCOUNTER — APPOINTMENT (OUTPATIENT)
Dept: PRIMARY CARE | Facility: CLINIC | Age: 64
End: 2024-02-29
Payer: COMMERCIAL

## 2024-03-14 ENCOUNTER — APPOINTMENT (OUTPATIENT)
Dept: UROLOGY | Facility: CLINIC | Age: 64
End: 2024-03-14

## 2024-03-25 ENCOUNTER — APPOINTMENT (OUTPATIENT)
Dept: UROLOGY | Facility: CLINIC | Age: 64
End: 2024-03-25
Payer: COMMERCIAL

## 2024-04-04 ENCOUNTER — OFFICE VISIT (OUTPATIENT)
Dept: PRIMARY CARE | Facility: CLINIC | Age: 64
End: 2024-04-04
Payer: COMMERCIAL

## 2024-04-04 VITALS
HEART RATE: 79 BPM | RESPIRATION RATE: 16 BRPM | OXYGEN SATURATION: 97 % | DIASTOLIC BLOOD PRESSURE: 76 MMHG | SYSTOLIC BLOOD PRESSURE: 133 MMHG | TEMPERATURE: 97.8 F | WEIGHT: 206.6 LBS | BODY MASS INDEX: 31.31 KG/M2 | HEIGHT: 68 IN

## 2024-04-04 DIAGNOSIS — Z11.59 ENCOUNTER FOR HEPATITIS C SCREENING TEST FOR LOW RISK PATIENT: ICD-10-CM

## 2024-04-04 DIAGNOSIS — R73.9 HYPERGLYCEMIA: ICD-10-CM

## 2024-04-04 DIAGNOSIS — E53.8 VITAMIN B12 DEFICIENCY: ICD-10-CM

## 2024-04-04 DIAGNOSIS — E55.9 VITAMIN D DEFICIENCY: ICD-10-CM

## 2024-04-04 DIAGNOSIS — Q62.11 HYDRONEPHROSIS WITH URETEROPELVIC JUNCTION (UPJ) OBSTRUCTION: ICD-10-CM

## 2024-04-04 DIAGNOSIS — Z23 NEED FOR SHINGLES VACCINE: ICD-10-CM

## 2024-04-04 DIAGNOSIS — I10 HYPERTENSION, UNSPECIFIED TYPE: ICD-10-CM

## 2024-04-04 DIAGNOSIS — I10 PRIMARY HYPERTENSION: Primary | ICD-10-CM

## 2024-04-04 PROCEDURE — 3075F SYST BP GE 130 - 139MM HG: CPT | Performed by: INTERNAL MEDICINE

## 2024-04-04 PROCEDURE — 3078F DIAST BP <80 MM HG: CPT | Performed by: INTERNAL MEDICINE

## 2024-04-04 PROCEDURE — 99214 OFFICE O/P EST MOD 30 MIN: CPT | Performed by: INTERNAL MEDICINE

## 2024-04-04 RX ORDER — ASPIRIN 81 MG/1
81 TABLET ORAL DAILY
COMMUNITY

## 2024-04-04 RX ORDER — LISINOPRIL 40 MG/1
40 TABLET ORAL DAILY
Qty: 90 TABLET | Refills: 1 | Status: SHIPPED | OUTPATIENT
Start: 2024-04-04 | End: 2024-10-01

## 2024-04-04 RX ORDER — AMLODIPINE BESYLATE 10 MG/1
10 TABLET ORAL DAILY
Qty: 90 TABLET | Refills: 1 | Status: SHIPPED | OUTPATIENT
Start: 2024-04-04

## 2024-04-04 RX ORDER — METOPROLOL TARTRATE 50 MG/1
50 TABLET ORAL EVERY 12 HOURS
Qty: 180 TABLET | Refills: 1 | Status: SHIPPED | OUTPATIENT
Start: 2024-04-04

## 2024-04-04 SDOH — ECONOMIC STABILITY: FOOD INSECURITY: WITHIN THE PAST 12 MONTHS, YOU WORRIED THAT YOUR FOOD WOULD RUN OUT BEFORE YOU GOT MONEY TO BUY MORE.: NEVER TRUE

## 2024-04-04 SDOH — ECONOMIC STABILITY: FOOD INSECURITY: WITHIN THE PAST 12 MONTHS, THE FOOD YOU BOUGHT JUST DIDN'T LAST AND YOU DIDN'T HAVE MONEY TO GET MORE.: NEVER TRUE

## 2024-04-04 SDOH — ECONOMIC STABILITY: INCOME INSECURITY
IN THE LAST 12 MONTHS, WAS THERE A TIME WHEN YOU WERE NOT ABLE TO PAY THE MORTGAGE OR RENT ON TIME?: PATIENT UNABLE TO ANSWER

## 2024-04-04 SDOH — ECONOMIC STABILITY: HOUSING INSECURITY
IN THE LAST 12 MONTHS, WAS THERE A TIME WHEN YOU DID NOT HAVE A STEADY PLACE TO SLEEP OR SLEPT IN A SHELTER (INCLUDING NOW)?: YES

## 2024-04-04 ASSESSMENT — LIFESTYLE VARIABLES
SKIP TO QUESTIONS 9-10: 1
HOW OFTEN DO YOU HAVE SIX OR MORE DRINKS ON ONE OCCASION: NEVER
HOW OFTEN DO YOU HAVE A DRINK CONTAINING ALCOHOL: NEVER
HOW MANY STANDARD DRINKS CONTAINING ALCOHOL DO YOU HAVE ON A TYPICAL DAY: PATIENT DOES NOT DRINK
AUDIT-C TOTAL SCORE: 0

## 2024-04-04 ASSESSMENT — PATIENT HEALTH QUESTIONNAIRE - PHQ9
SUM OF ALL RESPONSES TO PHQ9 QUESTIONS 1 & 2: 0
2. FEELING DOWN, DEPRESSED OR HOPELESS: NOT AT ALL
1. LITTLE INTEREST OR PLEASURE IN DOING THINGS: NOT AT ALL

## 2024-04-04 NOTE — PROGRESS NOTES
"Chief Complaint/HPI:    UPJ obstruction of the left kidney and hydronephrosis.  This was placed to drain he hydronephrosis, he has seen Dr Preston for treatment. The patient will follow up with Dr Rodgers tomorrow, \"I'm doing pretty good\"     HTN: patient takes amlodipine, lisinopril, metoprolol ordered , BP is  controlled     Vitamin d deficiency: this is noted, he takes no meds for this     Left shoulder pain: the shoulder is doing well now    Hyperglycemia: patient admits that younger brother has DM, patient denies urinary issues now, he urinates 2-3 times per night       ROS otherwise negative aside from what was mentioned above in HPI.      Patient Active Problem List   Diagnosis    Hypertension    Hydronephrosis with ureteropelvic junction (UPJ) obstruction    Benign prostatic hyperplasia with lower urinary tract symptoms    Hydronephrosis, left    Nocturia    Palpitations    Ureteropelvic junction (UPJ) obstruction, left    Vitamin D deficiency    HTN (hypertension), benign    Chronic left shoulder pain    Benign prostatic hyperplasia with urinary obstruction and other lower urinary tract symptoms    Left shoulder pain    Vitamin B12 deficiency    Hyperglycemia         Past Medical History:   Diagnosis Date    Benign prostatic hyperplasia with urinary obstruction and other lower urinary tract symptoms 04/14/2023    HTN (hypertension), benign 04/14/2023    Hydronephrosis with ureteropelvic junction (UPJ) obstruction 04/14/2023    Hydronephrosis, left 04/14/2023    Hypertension 04/14/2023    Nocturia 04/14/2023    Palpitations 04/14/2023    Personal history of other diseases of the circulatory system     History of hypertension    Ureteropelvic junction (UPJ) obstruction, left 04/14/2023    Vitamin B12 deficiency 04/14/2023    Vitamin D deficiency 04/14/2023     History reviewed. No pertinent surgical history.  Social History     Social History Narrative    Not on file         ALLERGIES  Patient has no known " "allergies.      MEDICATIONS  Current Outpatient Medications on File Prior to Visit   Medication Sig Dispense Refill    aspirin 81 mg EC tablet Take 1 tablet (81 mg) by mouth once daily.      [DISCONTINUED] amLODIPine (Norvasc) 10 mg tablet TAKE 1 TABLET BY MOUTH EVERY DAY 90 tablet 1    [DISCONTINUED] lisinopril 40 mg tablet Take 1 tablet (40 mg) by mouth once daily. 90 tablet 1    [DISCONTINUED] metoprolol tartrate (Lopressor) 50 mg tablet Take 1 tablet by mouth every 12 hours.      [DISCONTINUED] cefdinir (Omnicef) 300 mg capsule       [DISCONTINUED] oxybutynin (Ditropan) 5 mg tablet       [DISCONTINUED] tamsulosin (Flomax) 0.4 mg 24 hr capsule       [DISCONTINUED] traMADol (Ultram) 50 mg tablet TAKE 1 TABLET BY MOUTH EVEYR 6 HOURS AS NEEDED FOR PAIN       No current facility-administered medications on file prior to visit.         PHYSICAL EXAM  /76 (BP Location: Right arm, Patient Position: Sitting, BP Cuff Size: Adult)   Pulse 79   Temp 36.6 °C (97.8 °F)   Resp 16   Ht 1.727 m (5' 8\")   Wt 93.7 kg (206 lb 9.6 oz)   SpO2 97%   BMI 31.41 kg/m²   Body mass index is 31.41 kg/m².  Gen: Alert, NAD  HEENT:  wearing a mask today,  EOMI, conjunctiva and sclera normal in appearance, no thyromegaly or neck masses  Respiratory:  Lungs CTAB  Cardiovascular:  Heart RRR. No M/R/G, no carotid bruits, no peripheral edema noted  Neuro:  Gross motor and sensory intact  Skin:  No suspicious lesions present on exposed surfaces         ASSESSMENT/PLAN  Problem List Items Addressed This Visit       Hypertension - Primary    Relevant Medications    amLODIPine (Norvasc) 10 mg tablet    lisinopril 40 mg tablet    metoprolol tartrate (Lopressor) 50 mg tablet    Other Relevant Orders    CBC and Auto Differential    Comprehensive Metabolic Panel    Albumin , Urine Random    CBC and Auto Differential    Comprehensive Metabolic Panel    Lipid Panel    Hydronephrosis with ureteropelvic junction (UPJ) obstruction    Current " "Assessment & Plan     To follow up with urology tomorrow for recheck         Relevant Orders    CBC and Auto Differential    Comprehensive Metabolic Panel    Vitamin D deficiency    Relevant Orders    CBC and Auto Differential    Comprehensive Metabolic Panel    Vitamin D 25-Hydroxy,Total (for eval of Vitamin D levels)    Vitamin B12 deficiency    Relevant Orders    CBC and Auto Differential    Comprehensive Metabolic Panel    Vitamin B12    Hyperglycemia    Relevant Orders    Albumin , Urine Random    CBC and Auto Differential    Comprehensive Metabolic Panel    Hemoglobin A1C    Lipid Panel     Other Visit Diagnoses       Need for shingles vaccine        Relevant Medications    zoster vaccine-recombinant adjuvanted (Shingrix) 50 mcg/0.5 mL vaccine    Other Relevant Orders    CBC and Auto Differential    Comprehensive Metabolic Panel          Check labs as ordered, will add vitamin d or B12 if needed, patient denies current smoking, he states that he has smoked very little, he admits to smoking \"a little weed \"  only, Shingrix ordered for the patient at the pharmacy.    Patient would like to donate blood, advise getting labs checked first as ordered, he may be able to donate.     Follow up in 3 months, check labs when able    Bashir Huston MD   "

## 2024-04-05 ENCOUNTER — OFFICE VISIT (OUTPATIENT)
Dept: UROLOGY | Facility: CLINIC | Age: 64
End: 2024-04-05
Payer: COMMERCIAL

## 2024-04-05 VITALS — SYSTOLIC BLOOD PRESSURE: 150 MMHG | DIASTOLIC BLOOD PRESSURE: 73 MMHG | HEART RATE: 114 BPM

## 2024-04-05 DIAGNOSIS — N13.5 UPJ OBSTRUCTION, ACQUIRED: Primary | ICD-10-CM

## 2024-04-05 DIAGNOSIS — R10.10 PAIN OF UPPER ABDOMEN: ICD-10-CM

## 2024-04-05 PROCEDURE — 3077F SYST BP >= 140 MM HG: CPT | Performed by: UROLOGY

## 2024-04-05 PROCEDURE — 3078F DIAST BP <80 MM HG: CPT | Performed by: UROLOGY

## 2024-04-05 PROCEDURE — 99214 OFFICE O/P EST MOD 30 MIN: CPT | Performed by: UROLOGY

## 2024-04-05 NOTE — PROGRESS NOTES
04/05/2024  Patient complained of abdominal discomfort/pain over a month.  No flank pain no blood in the urine    Patient has no nausea, no vomiting, no fever.    NICOLETTE: Deferred    PSA pending    We discussed abdominal pain, CT scan abdomen pelvis without contrast for abdominal pain  We discussed left UPJ obstruction status post pyeloplasty  All the questions were answered, the patient expressed understanding and agreed to the plan.    Impression  Abdominal pain  Left UPJ obstruction, status post pyeloplasty  BPH  PSA screening    Plan  CT abdomen and pelvis without contrast for abdominal pain  appointment in 2 to 3 weeks    Creatinine 1.02 6/13/2023    No chief complaint on file.  Patient here today for yearly follow up, Hx of UPJ, PSA screening.  OP note from Dr. Preston 6/12/2023  1. ROBOT-ASSISTED LEFT DISMEMBERED PYELOPLASTY  2. LEFT URETERAL STENT EXCHANGE  3. LEFT RETROGRADE PYELOGRAM, LEFT URETEROSCOPY   PSA  12/28/2022  1.69  Voiding well, nocturia x 4     Physical Exam     TODAYS LAB RESULTS:      ASSESSMENT&PLAN:      IMPRESSIONS:       8/14/23  s/p L pyeloplasty 6/2023 7/24/23 stent removal in office [facility]   Doing well  no interval flank pain or gross hematuria        4/25/23  Telephone visit after left antegrade ureteral stent placement  Case personally discussed with interventional radiology, relatively clear evidence of UPJ obstruction on antegrade nephrostogram at that time.  Seen in ED again last weekend for flank pain  Now entirely resolved. Urine is cleared. Patient feeling well.     1/10/23  s/p TURP 12/29/22  Path - benign  Unable to locate L UO; No efflux noted from left, efflux seen from right     11/30/22  1. Bladder trabeculation/wall thickening  Unable to locate UOs on cystoscopy  Here today for UDS  Urodynamic Evaluation  Date of procedure: 11/30/22  Straight catheterization for Postvoid Residual: [] mL  Uroflow Study:  Amount Voided: 83 mL  Time to Max Flow: 8.1 sec  Maximum Flow Rate: 4.5  mL/sec  Average Flow Rate: 4.5 mL/sec  CMG with Voiding Pressure Study with intraabdominal probe:  First Sensation: 64 mL  First Urge: 66 mL  Capacity: 161 mL  Interpretation: low capacity, high pressure voiding with intermittency, poor compliance  Low capacity, poor compliance, outlet obstruction   VLPP was no leak  Leaking with Valsalva/coughing? no  Surface EMG Normal? yes, quiet      2. Left hydronephrosis   Presented to ED 7/2022 with left flank pain  CT with mild-moderate L HDN, concern for L UPJ obstruction. ?crossing vessel   now s/p lasix renogram. split function L 64% R 36%; evidence of mechanical obstruction on left   Unable to locate left ureteral orifice on cystoscopy with attempted retrograde pyelography 9/29/2022  Bladder biopsy at that time benign  Follow-up renal ultrasound 10/17/2022 with stable appearance of left hydronephrosis     3. Nephrolithiasis  3 mm left mid pole stone on CT      4. Nocturia     5. Prostate cancer screening   Due for PSA        11/11/22  1. Left hydronephrosis   Presented to ED 7/2022 with left flank pain  CT with mild-moderate L HDN, concern for L UPJ obstruction. ?crossing vessel   now s/p lasix renogram. split function L 64% R 36%; evidence of mechanical obstruction on left   Unable to locate left ureteral orifice on cystoscopy with attempted retrograde pyelography 9/29/2022  Bladder biopsy at that time benign  Follow-up renal ultrasound 10/17/2022 with stable appearance of left hydronephrosis     2. Nephrolithiasis  3 mm left mid pole stone on CT      3. Nocturia     4. Prostate cancer screening   Due for PSA     9/7/22  Established  1. Left hydronephrosis   Presented to ED 7/2022 with left flank pain  CT with mild-moderate L HDN, concern for L UPJ obstruction. ?crossing vessel   now s/p lasix renogram. split function L 64% R 36%; evidence of mechanical obstruction on left   Images and report reviewed.  No pain since ED visit in july  No other known history of Dietl's  crises      2. Nephrolithiasis  3 mm left mid pole stone on CT      3. Nocturia  Worse last few days   Doesn't drink much in evening   No LE edema  No snoring     4. Prostate cancer screening   Possible family history in father   PSA screening   8/16/22  NEW PATIENT   1. Left hydronephrosis   Presented to ED 7/2022 with left flank pain  CT with mild-moderate L HDN, concern for L UPJ obstruction. ?crossing vessel   Images and report reviewed.  No past  history      2. Nephrolithiasis  3 mm left mid pole stone on CT      3. Nocturia  Worse last few days   Doesn't drink much in evening   No LE edema  No snoring     4. Prostate cancer screening   Possible family history in father   PSA screening        PSA  12/28/2022 1.69    Surgery  6/12/2023  Robotic assisted left dismembered pyeloplasty, left ureteral stent, left ureteroscopy  s/p TURP 12/29/22

## 2024-04-19 ENCOUNTER — HOSPITAL ENCOUNTER (OUTPATIENT)
Dept: RADIOLOGY | Facility: CLINIC | Age: 64
Discharge: HOME | End: 2024-04-19
Payer: COMMERCIAL

## 2024-04-19 DIAGNOSIS — N13.5 UPJ OBSTRUCTION, ACQUIRED: ICD-10-CM

## 2024-04-19 DIAGNOSIS — R10.10 PAIN OF UPPER ABDOMEN: ICD-10-CM

## 2024-04-19 PROCEDURE — 74176 CT ABD & PELVIS W/O CONTRAST: CPT | Performed by: RADIOLOGY

## 2024-04-19 PROCEDURE — 74176 CT ABD & PELVIS W/O CONTRAST: CPT

## 2024-06-21 ENCOUNTER — TELEPHONE (OUTPATIENT)
Dept: UROLOGY | Facility: CLINIC | Age: 64
End: 2024-06-21
Payer: COMMERCIAL

## 2024-08-07 ENCOUNTER — TELEPHONE (OUTPATIENT)
Dept: PRIMARY CARE | Facility: CLINIC | Age: 64
End: 2024-08-07
Payer: COMMERCIAL

## 2024-08-07 NOTE — TELEPHONE ENCOUNTER
Pt called in concerned about his blood pressure medication, pt said the medication makes his head hurt from time to time and his kidney, pt Wanted to know what should he do.     Please advise       Pharmacy:[id number] - [place], [place] - [address]  [address] [address]  Phone: [phone]  Fax: [phone]

## 2024-08-08 NOTE — TELEPHONE ENCOUNTER
Spoke with patient and advised him per EJ  Let him know when he is able to schedule a nurse visit for BP check

## 2024-08-12 ENCOUNTER — APPOINTMENT (OUTPATIENT)
Dept: PRIMARY CARE | Facility: CLINIC | Age: 64
End: 2024-08-12
Payer: COMMERCIAL

## 2024-08-12 VITALS — DIASTOLIC BLOOD PRESSURE: 68 MMHG | HEART RATE: 100 BPM | TEMPERATURE: 97 F | SYSTOLIC BLOOD PRESSURE: 132 MMHG

## 2024-08-12 DIAGNOSIS — I10 HYPERTENSION, UNSPECIFIED TYPE: ICD-10-CM

## 2024-10-01 ENCOUNTER — LAB (OUTPATIENT)
Dept: LAB | Facility: LAB | Age: 64
End: 2024-10-01
Payer: COMMERCIAL

## 2024-10-01 DIAGNOSIS — Z23 NEED FOR SHINGLES VACCINE: ICD-10-CM

## 2024-10-01 DIAGNOSIS — E55.9 VITAMIN D DEFICIENCY: ICD-10-CM

## 2024-10-01 DIAGNOSIS — I10 PRIMARY HYPERTENSION: ICD-10-CM

## 2024-10-01 DIAGNOSIS — R73.9 HYPERGLYCEMIA: ICD-10-CM

## 2024-10-01 DIAGNOSIS — Q62.11 HYDRONEPHROSIS WITH URETEROPELVIC JUNCTION (UPJ) OBSTRUCTION: ICD-10-CM

## 2024-10-01 DIAGNOSIS — I10 HYPERTENSION, UNSPECIFIED TYPE: ICD-10-CM

## 2024-10-01 DIAGNOSIS — Z11.59 ENCOUNTER FOR HEPATITIS C SCREENING TEST FOR LOW RISK PATIENT: ICD-10-CM

## 2024-10-01 DIAGNOSIS — E53.8 VITAMIN B12 DEFICIENCY: ICD-10-CM

## 2024-10-01 LAB
25(OH)D3 SERPL-MCNC: 19 NG/ML (ref 30–100)
ALBUMIN SERPL BCP-MCNC: 4.6 G/DL (ref 3.4–5)
ALP SERPL-CCNC: 77 U/L (ref 33–136)
ALT SERPL W P-5'-P-CCNC: 48 U/L (ref 10–52)
ANION GAP SERPL CALC-SCNC: 11 MMOL/L (ref 10–20)
AST SERPL W P-5'-P-CCNC: 27 U/L (ref 9–39)
BASOPHILS # BLD AUTO: 0.03 X10*3/UL (ref 0–0.1)
BASOPHILS NFR BLD AUTO: 0.6 %
BILIRUB SERPL-MCNC: 0.9 MG/DL (ref 0–1.2)
BUN SERPL-MCNC: 12 MG/DL (ref 6–23)
CALCIUM SERPL-MCNC: 9.7 MG/DL (ref 8.6–10.3)
CHLORIDE SERPL-SCNC: 106 MMOL/L (ref 98–107)
CHOLEST SERPL-MCNC: 279 MG/DL (ref 0–199)
CHOLESTEROL/HDL RATIO: 5.2
CO2 SERPL-SCNC: 24 MMOL/L (ref 21–32)
CREAT SERPL-MCNC: 0.91 MG/DL (ref 0.5–1.3)
CREAT UR-MCNC: 144.1 MG/DL (ref 20–370)
EGFRCR SERPLBLD CKD-EPI 2021: >90 ML/MIN/1.73M*2
EOSINOPHIL # BLD AUTO: 0.1 X10*3/UL (ref 0–0.7)
EOSINOPHIL NFR BLD AUTO: 2 %
ERYTHROCYTE [DISTWIDTH] IN BLOOD BY AUTOMATED COUNT: 13.4 % (ref 11.5–14.5)
EST. AVERAGE GLUCOSE BLD GHB EST-MCNC: 126 MG/DL
GLUCOSE SERPL-MCNC: 111 MG/DL (ref 74–99)
HBA1C MFR BLD: 6 %
HCT VFR BLD AUTO: 46.9 % (ref 41–52)
HCV AB SER QL: NONREACTIVE
HDLC SERPL-MCNC: 53.8 MG/DL
HGB BLD-MCNC: 15.5 G/DL (ref 13.5–17.5)
IMM GRANULOCYTES # BLD AUTO: 0.02 X10*3/UL (ref 0–0.7)
IMM GRANULOCYTES NFR BLD AUTO: 0.4 % (ref 0–0.9)
LDLC SERPL CALC-MCNC: 207 MG/DL
LYMPHOCYTES # BLD AUTO: 1.76 X10*3/UL (ref 1.2–4.8)
LYMPHOCYTES NFR BLD AUTO: 36.1 %
MCH RBC QN AUTO: 29.9 PG (ref 26–34)
MCHC RBC AUTO-ENTMCNC: 33 G/DL (ref 32–36)
MCV RBC AUTO: 91 FL (ref 80–100)
MICROALBUMIN UR-MCNC: 160.9 MG/L
MICROALBUMIN/CREAT UR: 111.7 UG/MG CREAT
MONOCYTES # BLD AUTO: 0.46 X10*3/UL (ref 0.1–1)
MONOCYTES NFR BLD AUTO: 9.4 %
NEUTROPHILS # BLD AUTO: 2.51 X10*3/UL (ref 1.2–7.7)
NEUTROPHILS NFR BLD AUTO: 51.5 %
NON HDL CHOLESTEROL: 225 MG/DL (ref 0–149)
NRBC BLD-RTO: 0 /100 WBCS (ref 0–0)
PLATELET # BLD AUTO: 200 X10*3/UL (ref 150–450)
POTASSIUM SERPL-SCNC: 3.8 MMOL/L (ref 3.5–5.3)
PROT SERPL-MCNC: 7.3 G/DL (ref 6.4–8.2)
RBC # BLD AUTO: 5.18 X10*6/UL (ref 4.5–5.9)
SODIUM SERPL-SCNC: 137 MMOL/L (ref 136–145)
TRIGL SERPL-MCNC: 93 MG/DL (ref 0–149)
VIT B12 SERPL-MCNC: 561 PG/ML (ref 211–911)
VLDL: 19 MG/DL (ref 0–40)
WBC # BLD AUTO: 4.9 X10*3/UL (ref 4.4–11.3)

## 2024-10-01 PROCEDURE — 85025 COMPLETE CBC W/AUTO DIFF WBC: CPT

## 2024-10-01 PROCEDURE — 82043 UR ALBUMIN QUANTITATIVE: CPT

## 2024-10-01 PROCEDURE — 80061 LIPID PANEL: CPT

## 2024-10-01 PROCEDURE — 82570 ASSAY OF URINE CREATININE: CPT

## 2024-10-01 PROCEDURE — 82607 VITAMIN B-12: CPT

## 2024-10-01 PROCEDURE — 83036 HEMOGLOBIN GLYCOSYLATED A1C: CPT

## 2024-10-01 PROCEDURE — 86803 HEPATITIS C AB TEST: CPT

## 2024-10-01 PROCEDURE — 82306 VITAMIN D 25 HYDROXY: CPT

## 2024-10-01 PROCEDURE — 36415 COLL VENOUS BLD VENIPUNCTURE: CPT

## 2024-10-01 PROCEDURE — 80053 COMPREHEN METABOLIC PANEL: CPT

## 2024-10-03 ENCOUNTER — APPOINTMENT (OUTPATIENT)
Dept: PRIMARY CARE | Facility: CLINIC | Age: 64
End: 2024-10-03
Payer: COMMERCIAL

## 2024-10-03 VITALS
TEMPERATURE: 97.6 F | WEIGHT: 203.6 LBS | HEIGHT: 68 IN | DIASTOLIC BLOOD PRESSURE: 80 MMHG | RESPIRATION RATE: 16 BRPM | OXYGEN SATURATION: 95 % | SYSTOLIC BLOOD PRESSURE: 134 MMHG | BODY MASS INDEX: 30.86 KG/M2 | HEART RATE: 66 BPM

## 2024-10-03 DIAGNOSIS — E78.49 OTHER HYPERLIPIDEMIA: ICD-10-CM

## 2024-10-03 DIAGNOSIS — I10 HYPERTENSION, UNSPECIFIED TYPE: Primary | ICD-10-CM

## 2024-10-03 DIAGNOSIS — R73.9 HYPERGLYCEMIA: ICD-10-CM

## 2024-10-03 DIAGNOSIS — E53.8 VITAMIN B12 DEFICIENCY: ICD-10-CM

## 2024-10-03 DIAGNOSIS — I10 PRIMARY HYPERTENSION: ICD-10-CM

## 2024-10-03 DIAGNOSIS — E55.9 VITAMIN D DEFICIENCY: ICD-10-CM

## 2024-10-03 DIAGNOSIS — N40.1 BENIGN PROSTATIC HYPERPLASIA WITH LOWER URINARY TRACT SYMPTOMS, SYMPTOM DETAILS UNSPECIFIED: ICD-10-CM

## 2024-10-03 DIAGNOSIS — R68.82 DECREASED LIBIDO: ICD-10-CM

## 2024-10-03 PROBLEM — R42 DIZZINESS AND GIDDINESS: Status: RESOLVED | Noted: 2018-09-14 | Resolved: 2024-10-03

## 2024-10-03 PROBLEM — Q62.10: Status: ACTIVE | Noted: 2024-10-03

## 2024-10-03 PROBLEM — J11.1 INFLUENZA: Status: RESOLVED | Noted: 2018-01-11 | Resolved: 2024-10-03

## 2024-10-03 PROBLEM — Z86.79 HISTORY OF HYPERTENSION: Status: ACTIVE | Noted: 2024-10-03

## 2024-10-03 PROBLEM — R10.9 FLANK PAIN: Status: RESOLVED | Noted: 2023-04-16 | Resolved: 2024-10-03

## 2024-10-03 PROBLEM — Z20.822 CONTACT WITH AND (SUSPECTED) EXPOSURE TO COVID-19: Status: RESOLVED | Noted: 2023-04-13 | Resolved: 2024-10-03

## 2024-10-03 PROBLEM — R05.9 COUGH: Status: RESOLVED | Noted: 2018-01-11 | Resolved: 2024-10-03

## 2024-10-03 PROBLEM — N12 PYELONEPHRITIS: Status: ACTIVE | Noted: 2022-09-29

## 2024-10-03 PROCEDURE — 3075F SYST BP GE 130 - 139MM HG: CPT | Performed by: INTERNAL MEDICINE

## 2024-10-03 PROCEDURE — 3079F DIAST BP 80-89 MM HG: CPT | Performed by: INTERNAL MEDICINE

## 2024-10-03 PROCEDURE — 1036F TOBACCO NON-USER: CPT | Performed by: INTERNAL MEDICINE

## 2024-10-03 PROCEDURE — 1159F MED LIST DOCD IN RCRD: CPT | Performed by: INTERNAL MEDICINE

## 2024-10-03 PROCEDURE — 99214 OFFICE O/P EST MOD 30 MIN: CPT | Performed by: INTERNAL MEDICINE

## 2024-10-03 PROCEDURE — 1123F ACP DISCUSS/DSCN MKR DOCD: CPT | Performed by: INTERNAL MEDICINE

## 2024-10-03 PROCEDURE — 3008F BODY MASS INDEX DOCD: CPT | Performed by: INTERNAL MEDICINE

## 2024-10-03 RX ORDER — METOPROLOL TARTRATE 50 MG/1
50 TABLET ORAL EVERY 12 HOURS
Qty: 180 TABLET | Refills: 1 | Status: SHIPPED | OUTPATIENT
Start: 2024-10-03

## 2024-10-03 RX ORDER — LISINOPRIL 40 MG/1
40 TABLET ORAL DAILY
Qty: 90 TABLET | Refills: 1 | Status: SHIPPED | OUTPATIENT
Start: 2024-10-03 | End: 2025-04-01

## 2024-10-03 RX ORDER — ROSUVASTATIN CALCIUM 10 MG/1
10 TABLET, COATED ORAL DAILY
Qty: 90 TABLET | Refills: 3 | Status: SHIPPED | OUTPATIENT
Start: 2024-10-03 | End: 2025-11-07

## 2024-10-03 RX ORDER — ACETAMINOPHEN 500 MG
4000 TABLET ORAL DAILY
Qty: 100 CAPSULE | Refills: 2 | Status: SHIPPED | OUTPATIENT
Start: 2024-10-03

## 2024-10-03 RX ORDER — AMLODIPINE BESYLATE 10 MG/1
10 TABLET ORAL DAILY
Qty: 90 TABLET | Refills: 1 | Status: SHIPPED | OUTPATIENT
Start: 2024-10-03

## 2024-10-03 SDOH — ECONOMIC STABILITY: FOOD INSECURITY: WITHIN THE PAST 12 MONTHS, YOU WORRIED THAT YOUR FOOD WOULD RUN OUT BEFORE YOU GOT MONEY TO BUY MORE.: NEVER TRUE

## 2024-10-03 SDOH — ECONOMIC STABILITY: FOOD INSECURITY: WITHIN THE PAST 12 MONTHS, THE FOOD YOU BOUGHT JUST DIDN'T LAST AND YOU DIDN'T HAVE MONEY TO GET MORE.: NEVER TRUE

## 2024-10-03 ASSESSMENT — PATIENT HEALTH QUESTIONNAIRE - PHQ9
SUM OF ALL RESPONSES TO PHQ9 QUESTIONS 1 & 2: 0
1. LITTLE INTEREST OR PLEASURE IN DOING THINGS: NOT AT ALL
2. FEELING DOWN, DEPRESSED OR HOPELESS: NOT AT ALL

## 2024-10-03 ASSESSMENT — LIFESTYLE VARIABLES
SKIP TO QUESTIONS 9-10: 1
AUDIT-C TOTAL SCORE: 0
HOW MANY STANDARD DRINKS CONTAINING ALCOHOL DO YOU HAVE ON A TYPICAL DAY: PATIENT DOES NOT DRINK
HOW OFTEN DO YOU HAVE SIX OR MORE DRINKS ON ONE OCCASION: NEVER
HOW OFTEN DO YOU HAVE A DRINK CONTAINING ALCOHOL: NEVER

## 2024-10-03 NOTE — ASSESSMENT & PLAN NOTE
Continue diet modification, continue to monitor, this may contribute to libido loss, he does not have ED

## 2024-10-03 NOTE — PROGRESS NOTES
Chief Complaint/HPI:    HTN: patient takes amlodipine 10 mg, lisinopril 40 mg, metoprolol 50 mg as ordered , BP is 134/80. Stated about a year or so ago when he started taking all the medication at the same time he did not feel well. His chest would hurt and his body would feel off. He feels like he doesn't need it everyday.      Vitamin d deficiency: States he is feeling tired. Level is 19. He isn't taking supplemental vitamin D. Patient states that libido is poor now also     Hyperglycemia: patient admits that younger brother has DM, patient denies urinary issues now, he urinates 2-3 times per night. HgbA1C  6.0    Hyperlipidemia: Chol 279, , TG 93. He eats a lot of eggs, red meat.     Decreased libido: patient has low libido now, he denies ED, he wonders if vitamin d would be helpful    ROS otherwise negative aside from what was mentioned above in HPI.      Patient Active Problem List   Diagnosis    Hypertension    Hydronephrosis with ureteropelvic junction (UPJ) obstruction    Benign prostatic hyperplasia with lower urinary tract symptoms    Hydronephrosis, left    Nocturia    Palpitations    Ureteropelvic junction (UPJ) obstruction, left    Vitamin D deficiency    HTN (hypertension), benign    Chronic left shoulder pain    Benign prostatic hyperplasia with urinary obstruction and other lower urinary tract symptoms    Left shoulder pain    Vitamin B12 deficiency    Hyperglycemia    History of hypertension    Pyelonephritis    Stenosis of ureter    Other hyperlipidemia    Decreased libido         Past Medical History:   Diagnosis Date    Benign prostatic hyperplasia with urinary obstruction and other lower urinary tract symptoms 04/14/2023    Contact with and (suspected) exposure to covid-19 04/13/2023    Cough 01/11/2018    Dizziness and giddiness 09/14/2018    Flank pain 04/16/2023    HTN (hypertension), benign 04/14/2023    Hydronephrosis with ureteropelvic junction (UPJ) obstruction 04/14/2023     "Hydronephrosis, left 04/14/2023    Hypertension 04/14/2023    Influenza 01/11/2018    Nocturia 04/14/2023    Palpitations 04/14/2023    Personal history of other diseases of the circulatory system     History of hypertension    Ureteropelvic junction (UPJ) obstruction, left 04/14/2023    Vitamin B12 deficiency 04/14/2023    Vitamin D deficiency 04/14/2023     Past Surgical History:   Procedure Laterality Date    KIDNEY SURGERY Left 07/2023     Social History     Social History Narrative    Not on file         ALLERGIES  Patient has no known allergies.      MEDICATIONS  Current Outpatient Medications on File Prior to Visit   Medication Sig Dispense Refill    aspirin 81 mg EC tablet Take 1 tablet (81 mg) by mouth once daily.      [DISCONTINUED] amLODIPine (Norvasc) 10 mg tablet Take 1 tablet (10 mg) by mouth once daily. 90 tablet 1    [DISCONTINUED] metoprolol tartrate (Lopressor) 50 mg tablet Take 1 tablet by mouth every 12 hours. 180 tablet 1    [DISCONTINUED] lisinopril 40 mg tablet Take 1 tablet (40 mg) by mouth once daily. 90 tablet 1     No current facility-administered medications on file prior to visit.         PHYSICAL EXAM  /80 (BP Location: Left arm, Patient Position: Sitting, BP Cuff Size: Adult)   Pulse 66   Temp 36.4 °C (97.6 °F)   Resp 16   Ht 1.727 m (5' 8\")   Wt 92.4 kg (203 lb 9.6 oz)   SpO2 95%   BMI 30.96 kg/m²   Body mass index is 30.96 kg/m².  Gen: Alert, NAD  HEENT:  wearing mask, EOMI, conjunctiva and sclera normal in appearance, no thyromegaly or neck masses  Respiratory:  Lungs CTAB  Cardiovascular:  Heart RRR. No M/R/G, no carotid bruits, no peripheral edema noted  Neuro:  Gross motor and sensory intact  Skin:  No suspicious lesions present on exposed surfaces    ASSESSMENT/PLAN  Problem List Items Addressed This Visit       Benign prostatic hyperplasia with lower urinary tract symptoms    Current Assessment & Plan     Check PSA and testosterone levels, patient has decreased " libido also          Relevant Orders    Comprehensive Metabolic Panel    CBC and Auto Differential    Testosterone, total and free    PSA, total and free    Decreased libido    Current Assessment & Plan     Check PSA and testosterone levels, patient also takes multiple BP meds.          Relevant Orders    Comprehensive Metabolic Panel    CBC and Auto Differential    Testosterone, total and free    PSA, total and free    Hemoglobin A1C    Hyperglycemia    Current Assessment & Plan     Continue diet modification, continue to monitor, this may contribute to libido loss, he does not have ED         Relevant Medications    rosuvastatin (Crestor) 10 mg tablet    Other Relevant Orders    Lipid Panel    Comprehensive Metabolic Panel    CBC and Auto Differential    Albumin-Creatinine Ratio, Urine Random    Hemoglobin A1C    Hypertension - Primary    Current Assessment & Plan     Continue current regimen, ;patient has decrease in libido, BP is stable at present, will check testosterone level          Relevant Medications    amLODIPine (Norvasc) 10 mg tablet    lisinopril 40 mg tablet    metoprolol tartrate (Lopressor) 50 mg tablet    Other Relevant Orders    Comprehensive Metabolic Panel    CBC and Auto Differential    Albumin-Creatinine Ratio, Urine Random    Comprehensive Metabolic Panel    CBC and Auto Differential    Other hyperlipidemia    Current Assessment & Plan     Start Crestor 10 mg, LDL is over 200, recheck labs in 3-4 months         Relevant Medications    rosuvastatin (Crestor) 10 mg tablet    Other Relevant Orders    Lipid Panel    Comprehensive Metabolic Panel    CBC and Auto Differential    Hemoglobin A1C    Vitamin B12 deficiency    Current Assessment & Plan     Check labs, patient is tired         Relevant Orders    Comprehensive Metabolic Panel    CBC and Auto Differential    Vitamin D deficiency    Current Assessment & Plan     Start vitamin D supplementation, will start with 4000 units vitamin D 3  daily         Relevant Medications    cholecalciferol (Vitamin D3) 50 mcg (2,000 unit) capsule    Other Relevant Orders    Comprehensive Metabolic Panel    CBC and Auto Differential    Vitamin D 25-Hydroxy,Total (for eval of Vitamin D levels)     Start Vitamin D as well as multivitamin.  Complete blood work as ordered. Follow up in 4 months, check labs prior to next visit. Patient would NOT like flu vaccine at today's visit.     Bashir Huston MD

## 2024-10-03 NOTE — ASSESSMENT & PLAN NOTE
Continue current regimen, ;patient has decrease in libido, BP is stable at present, will check testosterone level

## 2024-10-18 ENCOUNTER — APPOINTMENT (OUTPATIENT)
Dept: UROLOGY | Facility: CLINIC | Age: 64
End: 2024-10-18
Payer: COMMERCIAL

## 2024-12-05 ENCOUNTER — APPOINTMENT (OUTPATIENT)
Dept: UROLOGY | Facility: CLINIC | Age: 64
End: 2024-12-05
Payer: COMMERCIAL

## 2024-12-05 VITALS
WEIGHT: 195 LBS | HEART RATE: 80 BPM | SYSTOLIC BLOOD PRESSURE: 149 MMHG | HEIGHT: 69 IN | DIASTOLIC BLOOD PRESSURE: 83 MMHG | BODY MASS INDEX: 28.88 KG/M2

## 2024-12-05 DIAGNOSIS — N13.5 UPJ OBSTRUCTION, ACQUIRED: ICD-10-CM

## 2024-12-05 DIAGNOSIS — N40.1 BENIGN PROSTATIC HYPERPLASIA WITH LOWER URINARY TRACT SYMPTOMS, SYMPTOM DETAILS UNSPECIFIED: Primary | ICD-10-CM

## 2024-12-05 LAB
POC BILIRUBIN, URINE: NEGATIVE
POC BLOOD, URINE: NEGATIVE
POC GLUCOSE, URINE: NEGATIVE MG/DL
POC KETONES, URINE: ABNORMAL MG/DL
POC LEUKOCYTES, URINE: NEGATIVE
POC NITRITE,URINE: NEGATIVE
POC PH, URINE: 6 PH
POC PROTEIN, URINE: ABNORMAL MG/DL
POC SPECIFIC GRAVITY, URINE: 1.02
POC UROBILINOGEN, URINE: 0.2 EU/DL

## 2024-12-05 PROCEDURE — 1159F MED LIST DOCD IN RCRD: CPT | Performed by: UROLOGY

## 2024-12-05 PROCEDURE — 99213 OFFICE O/P EST LOW 20 MIN: CPT | Performed by: UROLOGY

## 2024-12-05 PROCEDURE — 81003 URINALYSIS AUTO W/O SCOPE: CPT | Performed by: UROLOGY

## 2024-12-05 PROCEDURE — 1123F ACP DISCUSS/DSCN MKR DOCD: CPT | Performed by: UROLOGY

## 2024-12-05 PROCEDURE — 3079F DIAST BP 80-89 MM HG: CPT | Performed by: UROLOGY

## 2024-12-05 PROCEDURE — 3008F BODY MASS INDEX DOCD: CPT | Performed by: UROLOGY

## 2024-12-05 PROCEDURE — 1036F TOBACCO NON-USER: CPT | Performed by: UROLOGY

## 2024-12-05 PROCEDURE — 3077F SYST BP >= 140 MM HG: CPT | Performed by: UROLOGY

## 2024-12-05 NOTE — PROGRESS NOTES
12/05/2024  Voiding well, no flank pain    Patient has no nausea, no vomiting, no fever.    Creatinine 0.91 10/1/2024    We discussed CT scan abdomen pelvis without contrast for abdominal pain [place]  We discussed left UPJ obstruction status post pyeloplasty  We discussed normal creatinine, renal ultrasound  We discussed PSA screening  All the questions were answered, the patient expressed understanding and agreed to the plan.     Impression  Abdominal pain  Left UPJ obstruction, status post pyeloplasty  BPH  PSA screening     Plan  Renal ultrasound for left UPJ obstruction status post pyeloplasty follow-up  PSA check now and in a year  Appointment in a year    Chief Complaint   Patient presents with    Hydronephrosis     Patient is here today for follow up hydronephrosis .  He states sometimes he gets right flank pain.        Physical Exam     TODAYS LAB RESULTS:      Glucose, Urine  NEGATIVE mg/dl NEGATIVE   POC Bilirubin, Urine  NEGATIVE NEGATIVE   POC Ketones, Urine  NEGATIVE mg/dl TRACE Abnormal    POC Specific Gravity, Urine  1.005 - 1.035 1.025   POC Blood, Urine  NEGATIVE NEGATIVE   POC PH, Urine  No Reference Range Established PH 6.0   POC Protein, Urine  NEGATIVE, 30 (1+) mg/dl 100 (2+) Abnormal    POC Urobilinogen, Urine  0.2, 1.0 EU/DL 0.2   Poc Nitrite, Urine  NEGATIVE NEGATIVE   POC Leukocytes, Urine  NEGATIVE NEGATIVE       === 04/19/24 ===    CT ABDOMEN PELVIS WO IV CONTRAST    - Impression -  1.  Interval removal of a previously visualized left-sided  nephroureteral stent with persistent left-sided hydronephrosis and  dilated renal pelvis findings are consistent with residual/recurrent  PUJ obstruction  2. Circumferential urinary bladder wall thickening which may be  secondary to underdistention, although correlate with urinalysis and  concern for acute cystitis. Persistent calculi within the  nondependent portion of the right urinary bladder lumen adjacent to  the right ureterovesical junction which  measure up to 0.3 cm.  3. Prostatomegaly. Correlate with serum PSA.    I personally reviewed the images/study and I agree with the resident  findings as stated. This study was interpreted at [facility], [place], [place].    MACRO:  None    Signed by: Oziel Razo 4/20/2024 1:31 PM  Dictation workstation:   [id number]     Prostate Specific Antigen, Screen  Order: [id number]   Status: Final result       Visible to patient: No (inaccessible in [facility])    0 Result Notes      Component  Ref Range & Units 1 yr ago   Prostate Specific Antigen,Screen  0.00 - 4.00 ng/mL 1.69          ASSESSMENT&PLAN:      IMPRESSIONS:          04/05/2024  Patient complained of abdominal discomfort/pain over a month.  No flank pain no blood in the urine     Patient has no nausea, no vomiting, no fever.     NICOLETTE: Deferred     PSA pending     We discussed abdominal pain, CT scan abdomen pelvis without contrast for abdominal pain  We discussed left UPJ obstruction status post pyeloplasty  All the questions were answered, the patient expressed understanding and agreed to the plan.     Impression  Abdominal pain  Left UPJ obstruction, status post pyeloplasty  BPH  PSA screening     Plan  CT abdomen and pelvis without contrast for abdominal pain  appointment in 2 to 3 weeks     Creatinine 1.02 6/13/2023     No chief complaint on file.  Patient here today for yearly follow up, Hx of UPJ, PSA screening.  OP note from Dr. Preston 6/12/2023  1. ROBOT-ASSISTED LEFT DISMEMBERED PYELOPLASTY  2. LEFT URETERAL STENT EXCHANGE  3. LEFT RETROGRADE PYELOGRAM, LEFT URETEROSCOPY   PSA  12/28/2022  1.69  Voiding well, nocturia x 4     Physical Exam      TODAYS LAB RESULTS:        ASSESSMENT&PLAN:        IMPRESSIONS:        8/14/23  s/p L pyeloplasty 6/2023 7/24/23 stent removal in office [facility]   Doing well  no interval flank pain or gross hematuria        4/25/23  Telephone visit after left antegrade ureteral stent  placement  Case personally discussed with interventional radiology, relatively clear evidence of UPJ obstruction on antegrade nephrostogram at that time.  Seen in ED again last weekend for flank pain  Now entirely resolved. Urine is cleared. Patient feeling well.     1/10/23  s/p TURP 12/29/22  Path - benign  Unable to locate L UO; No efflux noted from left, efflux seen from right     11/30/22  1. Bladder trabeculation/wall thickening  Unable to locate UOs on cystoscopy  Here today for UDS  Urodynamic Evaluation  Date of procedure: 11/30/22  Straight catheterization for Postvoid Residual: [] mL  Uroflow Study:  Amount Voided: 83 mL  Time to Max Flow: 8.1 sec  Maximum Flow Rate: 4.5 mL/sec  Average Flow Rate: 4.5 mL/sec  CMG with Voiding Pressure Study with intraabdominal probe:  First Sensation: 64 mL  First Urge: 66 mL  Capacity: 161 mL  Interpretation: low capacity, high pressure voiding with intermittency, poor compliance  Low capacity, poor compliance, outlet obstruction   VLPP was no leak  Leaking with Valsalva/coughing? no  Surface EMG Normal? yes, quiet      2. Left hydronephrosis   Presented to ED 7/2022 with left flank pain  CT with mild-moderate L HDN, concern for L UPJ obstruction. ?crossing vessel   now s/p lasix renogram. split function L 64% R 36%; evidence of mechanical obstruction on left   Unable to locate left ureteral orifice on cystoscopy with attempted retrograde pyelography 9/29/2022  Bladder biopsy at that time benign  Follow-up renal ultrasound 10/17/2022 with stable appearance of left hydronephrosis     3. Nephrolithiasis  3 mm left mid pole stone on CT      4. Nocturia     5. Prostate cancer screening   Due for PSA        11/11/22  1. Left hydronephrosis   Presented to ED 7/2022 with left flank pain  CT with mild-moderate L HDN, concern for L UPJ obstruction. ?crossing vessel   now s/p lasix renogram. split function L 64% R 36%; evidence of mechanical obstruction on left   Unable to locate  left ureteral orifice on cystoscopy with attempted retrograde pyelography 9/29/2022  Bladder biopsy at that time benign  Follow-up renal ultrasound 10/17/2022 with stable appearance of left hydronephrosis     2. Nephrolithiasis  3 mm left mid pole stone on CT      3. Nocturia     4. Prostate cancer screening   Due for PSA     9/7/22  Established  1. Left hydronephrosis   Presented to ED 7/2022 with left flank pain  CT with mild-moderate L HDN, concern for L UPJ obstruction. ?crossing vessel   now s/p lasix renogram. split function L 64% R 36%; evidence of mechanical obstruction on left   Images and report reviewed.  No pain since ED visit in july  No other known history of Dietl's crises      2. Nephrolithiasis  3 mm left mid pole stone on CT      3. Nocturia  Worse last few days   Doesn't drink much in evening   No LE edema  No snoring     4. Prostate cancer screening   Possible family history in father   PSA screening   8/16/22  NEW PATIENT   1. Left hydronephrosis   Presented to ED 7/2022 with left flank pain  CT with mild-moderate L HDN, concern for L UPJ obstruction. ?crossing vessel   Images and report reviewed.  No past  history      2. Nephrolithiasis  3 mm left mid pole stone on CT      3. Nocturia  Worse last few days   Doesn't drink much in evening   No LE edema  No snoring     4. Prostate cancer screening   Possible family history in father   PSA screening         PSA  12/28/2022 1.69     Surgery  6/12/2023  Robotic assisted left dismembered pyeloplasty, left ureteral stent, left ureteroscopy  s/p TURP 12/29/22

## 2024-12-16 ENCOUNTER — LAB (OUTPATIENT)
Dept: LAB | Facility: LAB | Age: 64
End: 2024-12-16
Payer: COMMERCIAL

## 2024-12-16 DIAGNOSIS — N40.1 BENIGN PROSTATIC HYPERPLASIA WITH LOWER URINARY TRACT SYMPTOMS, SYMPTOM DETAILS UNSPECIFIED: ICD-10-CM

## 2024-12-16 DIAGNOSIS — N13.5 UPJ OBSTRUCTION, ACQUIRED: ICD-10-CM

## 2024-12-16 DIAGNOSIS — Z12.5 ENCOUNTER FOR SCREENING PROSTATE SPECIFIC ANTIGEN (PSA) MEASUREMENT: Primary | ICD-10-CM

## 2024-12-16 LAB — PSA SERPL-MCNC: 2.93 NG/ML

## 2024-12-16 PROCEDURE — 84153 ASSAY OF PSA TOTAL: CPT

## 2024-12-16 PROCEDURE — 36415 COLL VENOUS BLD VENIPUNCTURE: CPT

## 2025-01-09 ENCOUNTER — HOSPITAL ENCOUNTER (OUTPATIENT)
Dept: RADIOLOGY | Facility: CLINIC | Age: 65
Discharge: HOME | End: 2025-01-09
Payer: MEDICARE

## 2025-01-09 DIAGNOSIS — N40.1 BENIGN PROSTATIC HYPERPLASIA WITH LOWER URINARY TRACT SYMPTOMS, SYMPTOM DETAILS UNSPECIFIED: ICD-10-CM

## 2025-01-09 DIAGNOSIS — N13.5 UPJ OBSTRUCTION, ACQUIRED: ICD-10-CM

## 2025-01-09 PROCEDURE — 76770 US EXAM ABDO BACK WALL COMP: CPT

## 2025-01-09 PROCEDURE — 76770 US EXAM ABDO BACK WALL COMP: CPT | Performed by: STUDENT IN AN ORGANIZED HEALTH CARE EDUCATION/TRAINING PROGRAM

## 2025-02-03 ENCOUNTER — APPOINTMENT (OUTPATIENT)
Dept: PRIMARY CARE | Facility: CLINIC | Age: 65
End: 2025-02-03
Payer: COMMERCIAL

## 2025-02-21 ENCOUNTER — APPOINTMENT (OUTPATIENT)
Dept: PRIMARY CARE | Facility: CLINIC | Age: 65
End: 2025-02-21

## 2025-02-21 VITALS
DIASTOLIC BLOOD PRESSURE: 74 MMHG | HEART RATE: 57 BPM | TEMPERATURE: 97.5 F | OXYGEN SATURATION: 95 % | SYSTOLIC BLOOD PRESSURE: 120 MMHG | RESPIRATION RATE: 16 BRPM | HEIGHT: 69 IN | WEIGHT: 195 LBS | BODY MASS INDEX: 28.88 KG/M2

## 2025-02-21 DIAGNOSIS — Z00.00 ROUTINE GENERAL MEDICAL EXAMINATION AT HEALTH CARE FACILITY: Primary | ICD-10-CM

## 2025-02-21 DIAGNOSIS — E78.49 OTHER HYPERLIPIDEMIA: ICD-10-CM

## 2025-02-21 DIAGNOSIS — R73.9 HYPERGLYCEMIA: ICD-10-CM

## 2025-02-21 DIAGNOSIS — E53.8 VITAMIN B12 DEFICIENCY: ICD-10-CM

## 2025-02-21 DIAGNOSIS — I10 PRIMARY HYPERTENSION: ICD-10-CM

## 2025-02-21 DIAGNOSIS — Z00.00 ENCOUNTER FOR INITIAL ANNUAL WELLNESS VISIT (AWV) IN MEDICARE PATIENT: ICD-10-CM

## 2025-02-21 DIAGNOSIS — I10 HTN (HYPERTENSION), BENIGN: ICD-10-CM

## 2025-02-21 DIAGNOSIS — E55.9 VITAMIN D DEFICIENCY: ICD-10-CM

## 2025-02-21 DIAGNOSIS — R68.82 DECREASED LIBIDO: ICD-10-CM

## 2025-02-21 RX ORDER — ACETAMINOPHEN 500 MG
4000 TABLET ORAL DAILY
Qty: 100 CAPSULE | Refills: 2 | Status: SHIPPED | OUTPATIENT
Start: 2025-02-21

## 2025-02-21 RX ORDER — LISINOPRIL 40 MG/1
40 TABLET ORAL DAILY
Qty: 90 TABLET | Refills: 3 | Status: SHIPPED | OUTPATIENT
Start: 2025-02-21 | End: 2026-02-16

## 2025-02-21 RX ORDER — METOPROLOL TARTRATE 50 MG/1
50 TABLET ORAL EVERY 12 HOURS
Qty: 180 TABLET | Refills: 1 | Status: SHIPPED | OUTPATIENT
Start: 2025-02-21

## 2025-02-21 RX ORDER — AMLODIPINE BESYLATE 10 MG/1
10 TABLET ORAL DAILY
Qty: 90 TABLET | Refills: 3 | Status: SHIPPED | OUTPATIENT
Start: 2025-02-21

## 2025-02-21 SDOH — ECONOMIC STABILITY: FOOD INSECURITY: WITHIN THE PAST 12 MONTHS, THE FOOD YOU BOUGHT JUST DIDN'T LAST AND YOU DIDN'T HAVE MONEY TO GET MORE.: NEVER TRUE

## 2025-02-21 SDOH — ECONOMIC STABILITY: FOOD INSECURITY: WITHIN THE PAST 12 MONTHS, YOU WORRIED THAT YOUR FOOD WOULD RUN OUT BEFORE YOU GOT MONEY TO BUY MORE.: NEVER TRUE

## 2025-02-21 ASSESSMENT — ACTIVITIES OF DAILY LIVING (ADL)
GROCERY_SHOPPING: INDEPENDENT
DOING_HOUSEWORK: INDEPENDENT
MANAGING_FINANCES: INDEPENDENT
DRESSING: INDEPENDENT
TAKING_MEDICATION: INDEPENDENT
BATHING: INDEPENDENT

## 2025-02-21 ASSESSMENT — LIFESTYLE VARIABLES
HOW MANY STANDARD DRINKS CONTAINING ALCOHOL DO YOU HAVE ON A TYPICAL DAY: 1 OR 2
SKIP TO QUESTIONS 9-10: 1
HOW OFTEN DO YOU HAVE A DRINK CONTAINING ALCOHOL: MONTHLY OR LESS
HOW OFTEN DO YOU HAVE SIX OR MORE DRINKS ON ONE OCCASION: NEVER
AUDIT-C TOTAL SCORE: 1

## 2025-02-21 ASSESSMENT — PATIENT HEALTH QUESTIONNAIRE - PHQ9
1. LITTLE INTEREST OR PLEASURE IN DOING THINGS: SEVERAL DAYS
SUM OF ALL RESPONSES TO PHQ9 QUESTIONS 1 & 2: 2
2. FEELING DOWN, DEPRESSED OR HOPELESS: SEVERAL DAYS
10. IF YOU CHECKED OFF ANY PROBLEMS, HOW DIFFICULT HAVE THESE PROBLEMS MADE IT FOR YOU TO DO YOUR WORK, TAKE CARE OF THINGS AT HOME, OR GET ALONG WITH OTHER PEOPLE: SOMEWHAT DIFFICULT

## 2025-02-21 ASSESSMENT — ENCOUNTER SYMPTOMS
OCCASIONAL FEELINGS OF UNSTEADINESS: 0
LOSS OF SENSATION IN FEET: 0
DEPRESSION: 0

## 2025-02-21 ASSESSMENT — PAIN SCALES - GENERAL: PAINLEVEL_OUTOF10: 0-NO PAIN

## 2025-02-21 NOTE — PROGRESS NOTES
"    Subjective   Reason for Visit: Justin Godoy is an 64 y.o. male here for a Welcome to Medicare Wellness visit.     Past Medical, Surgical, and Family History reviewed and updated in chart.    Reviewed all medications by prescribing practitioner or clinical pharmacist (such as prescriptions, OTCs, herbal therapies and supplements) and documented in the medical record.    HPI    Follow up and Welcome to Medicare visit:    HTN: patient takes amlodipine 10 mg, lisinopril 40 mg, metoprolol 50 mg he has only been taking once daily instead of twice , BP is 120/74.      Vitamin d deficiency: States he is feeling tired. Level is 19. Patient states that libido is poor now also     Hyperglycemia: patient admits that younger brother has DM, patient denies urinary issues now. HgbA1C  6.0 last year    Hyperlipidemia: Chol 279, , TG 93 previously,  he takes rosuvastatin, he has not had follow up labs completed    Decreased libido: patient says urinary issues \"are OK now\", he still has low libido. He has seen urology       Patient Self Assessment of Health Status  Patient Self Assessment: Good    Nutrition and Exercise  Current Diet: Well Balanced Diet  Adequate Fluid Intake: Yes  Caffeine: Yes  Exercise Frequency: Infrequently    Functional Ability/Level of Safety  Cognitive Impairment Observed: No cognitive impairment observed  Cognitive Impairment Reported: No cognitive impairment reported by patient or family    Home Safety Risk Factors: None    Patient Care Team:  Bashir Huston MD as PCP - General (Internal Medicine)  Bashir Huston MD as PCP - [employer] PCP     Review of Systems    otherwise negative aside from what was mentioned above in HPI.  Objective   Vitals:  /74 (BP Location: Left arm, Patient Position: Sitting, BP Cuff Size: Large adult)   Pulse 57   Temp 36.4 °C (97.5 °F)   Resp 16   Ht 1.753 m (5' 9\")   Wt 88.5 kg (195 lb)   SpO2 95%   BMI 28.80 kg/m²   "     Physical Exam    Gen: Alert, NAD  HEENT:  wearing mask, EOMI, conjunctiva and sclera normal in appearance, no thyromegaly or neck masses, he is wearing a mask  Respiratory:  Lungs CTAB  Cardiovascular:  Heart RRR. No M/R/G, no carotid bruits, no peripheral edema noted  Abdomen: slightly distended, obese  Neuro:  Gross motor and sensory intact  Skin:  No suspicious lesions present on exposed surfaces      Assessment/Plan   Problem List Items Addressed This Visit       Decreased libido    Current Assessment & Plan     Testosterone level ordered, get the labs checked         HTN (hypertension), benign    Current Assessment & Plan     BP is controlled, take the metoprolol twice daily as ordered, he appears to be stable, get labs completed as ordered, ECG noted LVH, BP is stable today, continue med therapy as directed         Relevant Medications    amLODIPine (Norvasc) 10 mg tablet    lisinopril 40 mg tablet    Hyperglycemia    Current Assessment & Plan     Check labs as ordered         Hypertension    Relevant Medications    metoprolol tartrate (Lopressor) 50 mg tablet    Other hyperlipidemia    Current Assessment & Plan     He takes rosuvastatin now, check labs as ordered already         Routine general medical examination at health care facility - Primary    Current Assessment & Plan     ECG and vision testing completed, Welcome to Medicare exam completed today         Vitamin B12 deficiency    Vitamin D deficiency    Current Assessment & Plan     Check labs as ordered         Relevant Medications    cholecalciferol (Vitamin D3) 50 mcg (2,000 unit) capsule        Check all labs previously ordered, no med changes at the present time, Medicare exam completed  Follow up in 4-6 months

## 2025-02-21 NOTE — ASSESSMENT & PLAN NOTE
BP is controlled, take the metoprolol twice daily as ordered, he appears to be stable, get labs completed as ordered, ECG noted LVH, BP is stable today, continue med therapy as directed

## 2025-03-26 DIAGNOSIS — I10 HTN (HYPERTENSION), BENIGN: ICD-10-CM

## 2025-03-26 RX ORDER — AMLODIPINE BESYLATE 10 MG/1
10 TABLET ORAL DAILY
Qty: 90 TABLET | Refills: 1 | Status: SHIPPED | OUTPATIENT
Start: 2025-03-26

## 2025-05-21 ENCOUNTER — APPOINTMENT (OUTPATIENT)
Dept: PRIMARY CARE | Facility: CLINIC | Age: 65
End: 2025-05-21
Payer: COMMERCIAL

## 2025-05-22 DIAGNOSIS — I10 PRIMARY HYPERTENSION: ICD-10-CM

## 2025-05-22 RX ORDER — METOPROLOL TARTRATE 50 MG/1
TABLET ORAL
Qty: 180 TABLET | Refills: 0 | Status: SHIPPED | OUTPATIENT
Start: 2025-05-22

## 2025-08-21 DIAGNOSIS — E78.49 OTHER HYPERLIPIDEMIA: ICD-10-CM

## 2025-08-21 DIAGNOSIS — R73.9 HYPERGLYCEMIA: ICD-10-CM

## 2025-08-21 DIAGNOSIS — I10 PRIMARY HYPERTENSION: ICD-10-CM

## 2025-08-21 RX ORDER — METOPROLOL TARTRATE 50 MG/1
TABLET ORAL
Qty: 180 TABLET | Refills: 11 | Status: SHIPPED | OUTPATIENT
Start: 2025-08-21

## 2025-08-21 RX ORDER — ROSUVASTATIN CALCIUM 10 MG/1
10 TABLET, COATED ORAL
Qty: 90 TABLET | Refills: 11 | Status: SHIPPED | OUTPATIENT
Start: 2025-08-21

## 2025-08-27 ENCOUNTER — APPOINTMENT (OUTPATIENT)
Dept: PRIMARY CARE | Facility: CLINIC | Age: 65
End: 2025-08-27
Payer: COMMERCIAL

## 2025-11-06 ENCOUNTER — APPOINTMENT (OUTPATIENT)
Dept: PRIMARY CARE | Facility: CLINIC | Age: 65
End: 2025-11-06
Payer: COMMERCIAL

## 2025-12-04 ENCOUNTER — APPOINTMENT (OUTPATIENT)
Dept: UROLOGY | Facility: CLINIC | Age: 65
End: 2025-12-04
Payer: COMMERCIAL